# Patient Record
Sex: FEMALE | Race: BLACK OR AFRICAN AMERICAN | NOT HISPANIC OR LATINO
[De-identification: names, ages, dates, MRNs, and addresses within clinical notes are randomized per-mention and may not be internally consistent; named-entity substitution may affect disease eponyms.]

---

## 2017-01-31 ENCOUNTER — RESULT REVIEW (OUTPATIENT)
Age: 47
End: 2017-01-31

## 2018-05-16 ENCOUNTER — RESULT REVIEW (OUTPATIENT)
Age: 48
End: 2018-05-16

## 2018-08-22 PROBLEM — Z00.00 ENCOUNTER FOR PREVENTIVE HEALTH EXAMINATION: Status: ACTIVE | Noted: 2018-08-22

## 2018-08-29 ENCOUNTER — APPOINTMENT (OUTPATIENT)
Dept: SURGERY | Facility: CLINIC | Age: 48
End: 2018-08-29
Payer: COMMERCIAL

## 2018-08-29 PROCEDURE — 99205K: CUSTOM

## 2018-08-30 ENCOUNTER — OUTPATIENT (OUTPATIENT)
Dept: OUTPATIENT SERVICES | Facility: HOSPITAL | Age: 48
LOS: 1 days | End: 2018-08-30
Payer: COMMERCIAL

## 2018-08-30 ENCOUNTER — APPOINTMENT (OUTPATIENT)
Dept: MRI IMAGING | Facility: CLINIC | Age: 48
End: 2018-08-30
Payer: COMMERCIAL

## 2018-08-30 DIAGNOSIS — Z00.8 ENCOUNTER FOR OTHER GENERAL EXAMINATION: ICD-10-CM

## 2018-08-30 PROCEDURE — C8937: CPT

## 2018-08-30 PROCEDURE — 0159T: CPT | Mod: 26

## 2018-08-30 PROCEDURE — A9585: CPT

## 2018-08-30 PROCEDURE — C8908: CPT

## 2018-08-30 PROCEDURE — 82565 ASSAY OF CREATININE: CPT

## 2018-08-30 PROCEDURE — 77059 MRI BREAST BILATERAL: CPT | Mod: 26

## 2018-08-31 ENCOUNTER — RESULT REVIEW (OUTPATIENT)
Age: 48
End: 2018-08-31

## 2018-08-31 ENCOUNTER — OUTPATIENT (OUTPATIENT)
Dept: OUTPATIENT SERVICES | Facility: HOSPITAL | Age: 48
LOS: 1 days | End: 2018-08-31
Payer: COMMERCIAL

## 2018-08-31 PROCEDURE — 88321 CONSLTJ&REPRT SLD PREP ELSWR: CPT

## 2018-09-04 ENCOUNTER — RESULT REVIEW (OUTPATIENT)
Age: 48
End: 2018-09-04

## 2018-09-04 ENCOUNTER — OUTPATIENT (OUTPATIENT)
Dept: OUTPATIENT SERVICES | Facility: HOSPITAL | Age: 48
LOS: 1 days | End: 2018-09-04
Payer: COMMERCIAL

## 2018-09-04 ENCOUNTER — APPOINTMENT (OUTPATIENT)
Dept: ULTRASOUND IMAGING | Facility: IMAGING CENTER | Age: 48
End: 2018-09-04
Payer: COMMERCIAL

## 2018-09-04 ENCOUNTER — APPOINTMENT (OUTPATIENT)
Dept: MRI IMAGING | Facility: IMAGING CENTER | Age: 48
End: 2018-09-04
Payer: COMMERCIAL

## 2018-09-04 DIAGNOSIS — R92.8 OTHER ABNORMAL AND INCONCLUSIVE FINDINGS ON DIAGNOSTIC IMAGING OF BREAST: ICD-10-CM

## 2018-09-04 LAB — SURGICAL PATHOLOGY STUDY: SIGNIFICANT CHANGE UP

## 2018-09-04 PROCEDURE — 88360 TUMOR IMMUNOHISTOCHEM/MANUAL: CPT | Mod: 26

## 2018-09-04 PROCEDURE — 77066 DX MAMMO INCL CAD BI: CPT | Mod: 26

## 2018-09-04 PROCEDURE — 19085 BX BREAST 1ST LESION MR IMAG: CPT | Mod: RT

## 2018-09-04 PROCEDURE — A9585: CPT

## 2018-09-04 PROCEDURE — 76642 ULTRASOUND BREAST LIMITED: CPT | Mod: 26,LT

## 2018-09-04 PROCEDURE — 77066 DX MAMMO INCL CAD BI: CPT

## 2018-09-04 PROCEDURE — 88305 TISSUE EXAM BY PATHOLOGIST: CPT | Mod: 26

## 2018-09-04 PROCEDURE — 88360 TUMOR IMMUNOHISTOCHEM/MANUAL: CPT

## 2018-09-04 PROCEDURE — 88305 TISSUE EXAM BY PATHOLOGIST: CPT

## 2018-09-04 PROCEDURE — 19085 BX BREAST 1ST LESION MR IMAG: CPT

## 2018-09-04 PROCEDURE — 76642 ULTRASOUND BREAST LIMITED: CPT

## 2018-09-06 ENCOUNTER — OUTPATIENT (OUTPATIENT)
Dept: OUTPATIENT SERVICES | Facility: HOSPITAL | Age: 48
LOS: 1 days | End: 2018-09-06
Payer: COMMERCIAL

## 2018-09-06 VITALS
RESPIRATION RATE: 16 BRPM | WEIGHT: 151.9 LBS | TEMPERATURE: 98 F | HEART RATE: 75 BPM | HEIGHT: 62 IN | DIASTOLIC BLOOD PRESSURE: 78 MMHG | SYSTOLIC BLOOD PRESSURE: 120 MMHG

## 2018-09-06 DIAGNOSIS — Z90.711 ACQUIRED ABSENCE OF UTERUS WITH REMAINING CERVICAL STUMP: Chronic | ICD-10-CM

## 2018-09-06 DIAGNOSIS — D05.12 INTRADUCTAL CARCINOMA IN SITU OF LEFT BREAST: ICD-10-CM

## 2018-09-06 DIAGNOSIS — D05.10 INTRADUCTAL CARCINOMA IN SITU OF UNSPECIFIED BREAST: ICD-10-CM

## 2018-09-06 DIAGNOSIS — I10 ESSENTIAL (PRIMARY) HYPERTENSION: ICD-10-CM

## 2018-09-06 DIAGNOSIS — R06.83 SNORING: ICD-10-CM

## 2018-09-06 DIAGNOSIS — Z01.818 ENCOUNTER FOR OTHER PREPROCEDURAL EXAMINATION: ICD-10-CM

## 2018-09-06 LAB
ALBUMIN SERPL ELPH-MCNC: 4.6 G/DL — SIGNIFICANT CHANGE UP (ref 3.3–5)
ALP SERPL-CCNC: 46 U/L — SIGNIFICANT CHANGE UP (ref 40–120)
ALT FLD-CCNC: 28 U/L — SIGNIFICANT CHANGE UP (ref 4–33)
AST SERPL-CCNC: 21 U/L — SIGNIFICANT CHANGE UP (ref 4–32)
BILIRUB SERPL-MCNC: 3.6 MG/DL — HIGH (ref 0.2–1.2)
BLD GP AB SCN SERPL QL: NEGATIVE — SIGNIFICANT CHANGE UP
BUN SERPL-MCNC: 8 MG/DL — SIGNIFICANT CHANGE UP (ref 7–23)
CALCIUM SERPL-MCNC: 9.6 MG/DL — SIGNIFICANT CHANGE UP (ref 8.4–10.5)
CHLORIDE SERPL-SCNC: 103 MMOL/L — SIGNIFICANT CHANGE UP (ref 98–107)
CO2 SERPL-SCNC: 23 MMOL/L — SIGNIFICANT CHANGE UP (ref 22–31)
CREAT SERPL-MCNC: 0.67 MG/DL — SIGNIFICANT CHANGE UP (ref 0.5–1.3)
GLUCOSE SERPL-MCNC: 94 MG/DL — SIGNIFICANT CHANGE UP (ref 70–99)
HCT VFR BLD CALC: 32.3 % — LOW (ref 34.5–45)
HGB BLD-MCNC: 10.9 G/DL — LOW (ref 11.5–15.5)
MCHC RBC-ENTMCNC: 29 PG — SIGNIFICANT CHANGE UP (ref 27–34)
MCHC RBC-ENTMCNC: 33.7 % — SIGNIFICANT CHANGE UP (ref 32–36)
MCV RBC AUTO: 85.9 FL — SIGNIFICANT CHANGE UP (ref 80–100)
NRBC # FLD: 0 — SIGNIFICANT CHANGE UP
PLATELET # BLD AUTO: 234 K/UL — SIGNIFICANT CHANGE UP (ref 150–400)
PMV BLD: 11.4 FL — SIGNIFICANT CHANGE UP (ref 7–13)
POTASSIUM SERPL-MCNC: 3.4 MMOL/L — LOW (ref 3.5–5.3)
POTASSIUM SERPL-SCNC: 3.4 MMOL/L — LOW (ref 3.5–5.3)
PROT SERPL-MCNC: 7.5 G/DL — SIGNIFICANT CHANGE UP (ref 6–8.3)
RBC # BLD: 3.76 M/UL — LOW (ref 3.8–5.2)
RBC # FLD: 16.9 % — HIGH (ref 10.3–14.5)
RH IG SCN BLD-IMP: POSITIVE — SIGNIFICANT CHANGE UP
SODIUM SERPL-SCNC: 140 MMOL/L — SIGNIFICANT CHANGE UP (ref 135–145)
WBC # BLD: 7.29 K/UL — SIGNIFICANT CHANGE UP (ref 3.8–10.5)
WBC # FLD AUTO: 7.29 K/UL — SIGNIFICANT CHANGE UP (ref 3.8–10.5)

## 2018-09-06 PROCEDURE — 71046 X-RAY EXAM CHEST 2 VIEWS: CPT | Mod: 26

## 2018-09-06 PROCEDURE — 93010 ELECTROCARDIOGRAM REPORT: CPT

## 2018-09-06 RX ORDER — SODIUM CHLORIDE 9 MG/ML
3 INJECTION INTRAMUSCULAR; INTRAVENOUS; SUBCUTANEOUS EVERY 8 HOURS
Qty: 0 | Refills: 0 | Status: DISCONTINUED | OUTPATIENT
Start: 2018-10-09 | End: 2018-10-09

## 2018-09-06 RX ORDER — SODIUM CHLORIDE 9 MG/ML
1000 INJECTION, SOLUTION INTRAVENOUS
Qty: 0 | Refills: 0 | Status: DISCONTINUED | OUTPATIENT
Start: 2018-10-09 | End: 2018-10-09

## 2018-09-06 NOTE — H&P PST ADULT - NEGATIVE OPHTHALMOLOGIC SYMPTOMS
no loss of vision R/no lacrimation L/no lacrimation R/no blurred vision L/no blurred vision R/no loss of vision L/no discharge R/no discharge L/no diplopia

## 2018-09-06 NOTE — H&P PST ADULT - PSH
delivery in  for one of the twin and one twin  vaginal delivery -     h/o   x 2 - 20 yrs ago    H/O tubal ligation    History of cholecystectomy  delivery in  for one of the twin and one twin  vaginal delivery -     h/o   x 2 - 20 yrs ago    H/O tubal ligation    History of cholecystectomy    Status post partial hysterectomy  2012

## 2018-09-06 NOTE — H&P PST ADULT - PROBLEM SELECTOR PLAN 1
Pt initial scheduled for left breast mastectomy, procedure chanaged as per Dr. Cruz.  Pt scheduled for bilateral simple mastectomy confirmed by surgical coordinator (Starla) of Dr. Cruz with reconstruction on 9/11/18 (tentative).     Pre-op instructions given, patient verbalized understanding.   Pepcid given to patient for GI prophylaxis.   Chlorhexidine wash provided, instructions given.  Pt instructed to bring urine sample the day of the surgery.     Medical evaluation requested due to current cold, pending medical clearance.

## 2018-09-06 NOTE — H&P PST ADULT - NEGATIVE NEUROLOGICAL SYMPTOMS
no transient paralysis/no headache/no tremors/no vertigo/no loss of sensation/no difficulty walking/no weakness/no paresthesias

## 2018-09-06 NOTE — H&P PST ADULT - NEGATIVE GENERAL GENITOURINARY SYMPTOMS
no flank pain L/no dysuria/no nocturia/no incontinence/no bladder infections/normal urinary frequency/no urinary hesitancy/no hematuria/no renal colic/no flank pain R

## 2018-09-06 NOTE — H&P PST ADULT - HISTORY OF PRESENT ILLNESS
47 year old female presents to PST for intraductal carcinoma in situ of left breast.  Routine screening for mammogram 8/1 was abnormal. Pt was called o do biopsy.  1st biopsy 8/17, 2nd biopsy 9/4.  Pt scheduled for b/l mastectomy with reconstruction on 9/11/18. 47 year old female presents to PST for intraductal carcinoma in situ of left breast.  Routine screening for mammogram 8/1 was abnormal. Pt was called o do biopsy.  1st biopsy 8/17, 2nd biopsy 9/4.  Pt scheduled for b/l mastectomy with reconstruction on 9/11/18 (tentative).  Pt was initial scheduled for left breast mastectomy. Procedure changed to b/l mastectomy 9/6.  Confirmed with surgical coordinator, Starla.

## 2018-09-06 NOTE — H&P PST ADULT - PMH
Anemia    Endometriosis    Gall stones    H/O uterine leiomyoma    Hypertension  Feb 2018  Intraductal carcinoma in situ of breast

## 2018-09-06 NOTE — H&P PST ADULT - NSANTHOSAYNRD_GEN_A_CORE
No. MACHO screening performed.  STOP BANG Legend: 0-2 = LOW Risk; 3-4 = INTERMEDIATE Risk; 5-8 = HIGH Risk/never been tested

## 2018-09-06 NOTE — H&P PST ADULT - NEGATIVE ENMT SYMPTOMS
no throat pain/no post-nasal discharge/no nose bleeds/no nasal obstruction/no tinnitus/no vertigo/no sinus symptoms/no hearing difficulty/no dysphagia/no ear pain

## 2018-09-06 NOTE — H&P PST ADULT - PROBLEM SELECTOR PLAN 2
Pt instructed to take Bystolic the morning of the surgery.    Pt instructed to hold aspirin 1 week prior to surgery.

## 2018-09-06 NOTE — H&P PST ADULT - LYMPHATIC
posterior cervical L/anterior cervical R/anterior cervical L/supraclavicular R/posterior cervical R/supraclavicular L

## 2018-09-07 PROBLEM — I10 ESSENTIAL (PRIMARY) HYPERTENSION: Chronic | Status: ACTIVE | Noted: 2018-09-06

## 2018-09-11 ENCOUNTER — APPOINTMENT (OUTPATIENT)
Dept: MAMMOGRAPHY | Facility: HOSPITAL | Age: 48
End: 2018-09-11

## 2018-09-25 ENCOUNTER — TRANSCRIPTION ENCOUNTER (OUTPATIENT)
Age: 48
End: 2018-09-25

## 2018-10-09 ENCOUNTER — RESULT REVIEW (OUTPATIENT)
Age: 48
End: 2018-10-09

## 2018-10-09 ENCOUNTER — APPOINTMENT (OUTPATIENT)
Dept: NUCLEAR MEDICINE | Facility: HOSPITAL | Age: 48
End: 2018-10-09

## 2018-10-09 ENCOUNTER — INPATIENT (INPATIENT)
Facility: HOSPITAL | Age: 48
LOS: 0 days | Discharge: HOME | End: 2018-10-10
Attending: SURGERY | Admitting: SURGERY
Payer: COMMERCIAL

## 2018-10-09 ENCOUNTER — APPOINTMENT (OUTPATIENT)
Dept: SURGERY | Facility: HOSPITAL | Age: 48
End: 2018-10-09

## 2018-10-09 VITALS
OXYGEN SATURATION: 100 % | HEIGHT: 62 IN | TEMPERATURE: 98 F | WEIGHT: 151.9 LBS | DIASTOLIC BLOOD PRESSURE: 80 MMHG | RESPIRATION RATE: 16 BRPM | HEART RATE: 68 BPM | SYSTOLIC BLOOD PRESSURE: 142 MMHG

## 2018-10-09 DIAGNOSIS — Z90.711 ACQUIRED ABSENCE OF UTERUS WITH REMAINING CERVICAL STUMP: Chronic | ICD-10-CM

## 2018-10-09 DIAGNOSIS — D05.12 INTRADUCTAL CARCINOMA IN SITU OF LEFT BREAST: ICD-10-CM

## 2018-10-09 LAB
BLD GP AB SCN SERPL QL: NEGATIVE — SIGNIFICANT CHANGE UP
HCG UR QL: NEGATIVE — SIGNIFICANT CHANGE UP
RH IG SCN BLD-IMP: POSITIVE — SIGNIFICANT CHANGE UP

## 2018-10-09 PROCEDURE — 88331 PATH CONSLTJ SURG 1 BLK 1SPC: CPT | Mod: 26

## 2018-10-09 PROCEDURE — 15734 MUSCLE-SKIN GRAFT TRUNK: CPT | Mod: 59,RT

## 2018-10-09 PROCEDURE — 88332 PATH CONSLTJ SURG EA ADD BLK: CPT | Mod: 26

## 2018-10-09 PROCEDURE — 15777 ACELLULAR DERM MATRIX IMPLT: CPT | Mod: 50

## 2018-10-09 PROCEDURE — 88307 TISSUE EXAM BY PATHOLOGIST: CPT | Mod: 26

## 2018-10-09 PROCEDURE — 19357 TISS XPNDR PLMT BRST RCNSTJ: CPT | Mod: 50

## 2018-10-09 PROCEDURE — 12034 INTMD RPR S/TR/EXT 7.6-12.5: CPT | Mod: 59

## 2018-10-09 RX ORDER — LOSARTAN POTASSIUM 100 MG/1
100 TABLET, FILM COATED ORAL DAILY
Qty: 0 | Refills: 0 | Status: DISCONTINUED | OUTPATIENT
Start: 2018-10-10 | End: 2018-10-10

## 2018-10-09 RX ORDER — METOPROLOL TARTRATE 50 MG
5 TABLET ORAL EVERY 6 HOURS
Qty: 0 | Refills: 0 | Status: DISCONTINUED | OUTPATIENT
Start: 2018-10-09 | End: 2018-10-10

## 2018-10-09 RX ORDER — DEXTROSE MONOHYDRATE, SODIUM CHLORIDE, AND POTASSIUM CHLORIDE 50; .745; 4.5 G/1000ML; G/1000ML; G/1000ML
1000 INJECTION, SOLUTION INTRAVENOUS
Qty: 0 | Refills: 0 | Status: DISCONTINUED | OUTPATIENT
Start: 2018-10-09 | End: 2018-10-09

## 2018-10-09 RX ORDER — CEFAZOLIN SODIUM 1 G
1000 VIAL (EA) INJECTION EVERY 8 HOURS
Qty: 0 | Refills: 0 | Status: DISCONTINUED | OUTPATIENT
Start: 2018-10-09 | End: 2018-10-10

## 2018-10-09 RX ORDER — ONDANSETRON 8 MG/1
4 TABLET, FILM COATED ORAL EVERY 8 HOURS
Qty: 0 | Refills: 0 | Status: DISCONTINUED | OUTPATIENT
Start: 2018-10-09 | End: 2018-10-10

## 2018-10-09 RX ORDER — NEBIVOLOL HYDROCHLORIDE 5 MG/1
1 TABLET ORAL
Qty: 0 | Refills: 0 | COMMUNITY

## 2018-10-09 RX ORDER — FOLIC ACID 0.8 MG
1 TABLET ORAL DAILY
Qty: 0 | Refills: 0 | Status: DISCONTINUED | OUTPATIENT
Start: 2018-10-10 | End: 2018-10-10

## 2018-10-09 RX ORDER — METOCLOPRAMIDE HCL 10 MG
10 TABLET ORAL ONCE
Qty: 0 | Refills: 0 | Status: DISCONTINUED | OUTPATIENT
Start: 2018-10-09 | End: 2018-10-09

## 2018-10-09 RX ORDER — ACETAMINOPHEN 500 MG
1000 TABLET ORAL ONCE
Qty: 0 | Refills: 0 | Status: COMPLETED | OUTPATIENT
Start: 2018-10-09 | End: 2018-10-09

## 2018-10-09 RX ORDER — DIAZEPAM 5 MG
5 TABLET ORAL EVERY 6 HOURS
Qty: 0 | Refills: 0 | Status: DISCONTINUED | OUTPATIENT
Start: 2018-10-09 | End: 2018-10-10

## 2018-10-09 RX ORDER — ACETAMINOPHEN 500 MG
650 TABLET ORAL EVERY 6 HOURS
Qty: 0 | Refills: 0 | Status: DISCONTINUED | OUTPATIENT
Start: 2018-10-09 | End: 2018-10-10

## 2018-10-09 RX ORDER — HYDROMORPHONE HYDROCHLORIDE 2 MG/ML
0.5 INJECTION INTRAMUSCULAR; INTRAVENOUS; SUBCUTANEOUS
Qty: 0 | Refills: 0 | Status: DISCONTINUED | OUTPATIENT
Start: 2018-10-09 | End: 2018-10-09

## 2018-10-09 RX ORDER — OXYCODONE HYDROCHLORIDE 5 MG/1
5 TABLET ORAL EVERY 4 HOURS
Qty: 0 | Refills: 0 | Status: DISCONTINUED | OUTPATIENT
Start: 2018-10-09 | End: 2018-10-10

## 2018-10-09 RX ORDER — OXYCODONE HYDROCHLORIDE 5 MG/1
10 TABLET ORAL EVERY 4 HOURS
Qty: 0 | Refills: 0 | Status: DISCONTINUED | OUTPATIENT
Start: 2018-10-09 | End: 2018-10-10

## 2018-10-09 RX ORDER — ENOXAPARIN SODIUM 100 MG/ML
40 INJECTION SUBCUTANEOUS ONCE
Qty: 0 | Refills: 0 | Status: COMPLETED | OUTPATIENT
Start: 2018-10-09 | End: 2018-10-09

## 2018-10-09 RX ORDER — INFLUENZA VIRUS VACCINE 15; 15; 15; 15 UG/.5ML; UG/.5ML; UG/.5ML; UG/.5ML
0.5 SUSPENSION INTRAMUSCULAR ONCE
Qty: 0 | Refills: 0 | Status: COMPLETED | OUTPATIENT
Start: 2018-10-09 | End: 2018-10-10

## 2018-10-09 RX ORDER — ONDANSETRON 8 MG/1
4 TABLET, FILM COATED ORAL ONCE
Qty: 0 | Refills: 0 | Status: DISCONTINUED | OUTPATIENT
Start: 2018-10-09 | End: 2018-10-09

## 2018-10-09 RX ADMIN — Medication 100 MILLIGRAM(S): at 18:29

## 2018-10-09 RX ADMIN — DEXTROSE MONOHYDRATE, SODIUM CHLORIDE, AND POTASSIUM CHLORIDE 100 MILLILITER(S): 50; .745; 4.5 INJECTION, SOLUTION INTRAVENOUS at 14:49

## 2018-10-09 RX ADMIN — Medication 1000 MILLIGRAM(S): at 18:00

## 2018-10-09 RX ADMIN — Medication 400 MILLIGRAM(S): at 17:37

## 2018-10-09 RX ADMIN — Medication 5 MILLIGRAM(S): at 15:28

## 2018-10-09 RX ADMIN — ENOXAPARIN SODIUM 40 MILLIGRAM(S): 100 INJECTION SUBCUTANEOUS at 18:29

## 2018-10-09 RX ADMIN — DEXTROSE MONOHYDRATE, SODIUM CHLORIDE, AND POTASSIUM CHLORIDE 100 MILLILITER(S): 50; .745; 4.5 INJECTION, SOLUTION INTRAVENOUS at 17:37

## 2018-10-09 NOTE — ASU PATIENT PROFILE, ADULT - VISION (WITH CORRECTIVE LENSES IF THE PATIENT USUALLY WEARS THEM):
Normal vision: sees adequately in most situations; can see medication labels, newsprint/contact use Partially impaired: cannot see medication labels or newsprint, but can see obstacles in path, and the surrounding layout; can count fingers at arm's length/wears glasses

## 2018-10-09 NOTE — BRIEF OPERATIVE NOTE - PROCEDURE
<<-----Click on this checkbox to enter Procedure Breast reconstruction with tissue expander  10/09/2018  Placement of Bilateral Tissue Expanders  Active  JUSS

## 2018-10-09 NOTE — ASU PREOP CHECKLIST - SELECT TESTS ORDERED
PT/PTT/CMP/CBC/INR CBC/CMP/Type and Cross/PT/PTT/INR/Type and Screen CMP/Type and Cross/INR/UCG/PT/PTT/Type and Screen/UCG - NEGATIVE/CBC

## 2018-10-10 ENCOUNTER — TRANSCRIPTION ENCOUNTER (OUTPATIENT)
Age: 48
End: 2018-10-10

## 2018-10-10 VITALS
OXYGEN SATURATION: 100 % | HEART RATE: 79 BPM | DIASTOLIC BLOOD PRESSURE: 60 MMHG | RESPIRATION RATE: 18 BRPM | SYSTOLIC BLOOD PRESSURE: 106 MMHG | TEMPERATURE: 99 F

## 2018-10-10 RX ORDER — CEPHALEXIN 500 MG
1 CAPSULE ORAL
Qty: 0 | Refills: 0 | COMMUNITY

## 2018-10-10 RX ORDER — ACETAMINOPHEN 500 MG
2 TABLET ORAL
Qty: 0 | Refills: 0 | COMMUNITY
Start: 2018-10-10

## 2018-10-10 RX ORDER — OXYCODONE HYDROCHLORIDE 5 MG/1
1 TABLET ORAL
Qty: 0 | Refills: 0 | COMMUNITY
Start: 2018-10-10

## 2018-10-10 RX ORDER — DIAZEPAM 5 MG
1 TABLET ORAL
Qty: 0 | Refills: 0 | COMMUNITY
Start: 2018-10-10

## 2018-10-10 RX ORDER — LOSARTAN POTASSIUM 100 MG/1
100 TABLET, FILM COATED ORAL DAILY
Qty: 0 | Refills: 0 | Status: DISCONTINUED | OUTPATIENT
Start: 2018-10-10 | End: 2018-10-10

## 2018-10-10 RX ADMIN — Medication 1 MILLIGRAM(S): at 10:13

## 2018-10-10 RX ADMIN — Medication 5 MILLIGRAM(S): at 10:35

## 2018-10-10 RX ADMIN — Medication 5 MILLIGRAM(S): at 01:37

## 2018-10-10 RX ADMIN — INFLUENZA VIRUS VACCINE 0.5 MILLILITER(S): 15; 15; 15; 15 SUSPENSION INTRAMUSCULAR at 10:17

## 2018-10-10 RX ADMIN — Medication 100 MILLIGRAM(S): at 03:17

## 2018-10-10 RX ADMIN — Medication 100 MILLIGRAM(S): at 10:13

## 2018-10-10 NOTE — DISCHARGE NOTE ADULT - CARE PROVIDERS DIRECT ADDRESSES
[email or web address]

## 2018-10-10 NOTE — DISCHARGE NOTE ADULT - INSTRUCTIONS
Regular diet drink 9-13 eight oz. glasses of fluid daily. call md for follow up appt. call md for sign of infection (temp greater than 101f, redness at incision, pain not relieved by meds). Regular diet    - Please keep drain sites and incisions clean and dry.  - Do not lift your operated arm(s) above shoulder height.   - Do not push or pull yourself onto or off the bed with your operated arm(s). Instead use the roll technique to get in or out of bed.   - Avoid heavy activities and (sudden) lifting.  - Avoid lifting any objects that weigh more than 10 pounds or a gallon of milk.   - You may begin a walking program, but do not “break a sweat”.  - Be mindful of your posture while you are healing.  - Try not to be round shouldered or in a slouched posture.  - Avoid tub bathing until the sutures are well healed, usually three weeks.    - For the next 2 weeks, you should examine your breasts at least three times daily. This is most easily done when you use the bathroom/restroom.   - If you notice new bruising or breast fullness that wasn't there previously, CALL the office IMMEDIATELY.     - You will be discharged with ANIBAL drains. You will need to empty them and record outputs accurately. This will be taught to you by the nursing staff. Please do not remove the ANIBAL drains. They will be removed in the office. Please bring to the office accurate records of output.

## 2018-10-10 NOTE — DISCHARGE NOTE ADULT - HOSPITAL COURSE
47 year old female presents to PST for intraductal carcinoma in situ of left breast.  Routine screening for mammogram 8/1 was abnormal. Pt was called o do biopsy.  1st biopsy 8/17, 2nd biopsy 9/4.  Pt scheduled for b/l mastectomy with reconstruction on 9/11/18 (tentative).  Pt was initial scheduled for left breast mastectomy. Procedure changed to b/l mastectomy 9/6.      Patient is status post bilateral mastectomy with tissue expanders.  Patient has 2 JPs.    Patient did well post operatively.  Pain is well controlled.  Patient is ambulating and voiding without difficulty.     Patient is stable for discharge home with ANIBAL drains and will follow up with Dr Cruz and Dr Huggins. 47 year old female presents to PST for intraductal carcinoma in situ of left breast.  Routine screening for mammogram 8/1 was abnormal. Pt was called o do biopsy.  1st biopsy 8/17, 2nd biopsy 9/4.  Pt scheduled for b/l mastectomy with reconstruction on 9/11/18 (tentative).  Pt was initial scheduled for left breast mastectomy. Procedure changed to b/l mastectomy 9/6.      Patient is status post bilateral mastectomy with tissue expanders.  Patient has 2 JPs.    Patient did well post operatively.  Pain is well controlled.  Patient is ambulating and voiding without difficulty.     Patient has been taught ANIBAL drain care/emptying and feels comfortable performing this task and documenting it.    Patient is stable for discharge home with ANIBAL drains and will follow up with Dr Cruz and Dr Huggins.

## 2018-10-10 NOTE — DISCHARGE NOTE ADULT - CARE PROVIDER_API CALL
Michelle Cruz (MD), FPPLJ Breast Surgery  [address]  [address]  [place], [place] [id number]  Phone: [phone]  Fax: [phone]    Jagdish Huggins), Plastic Surgery  [address] [place] [address]  [address]  [place] [address]  Phone: [phone]  Fax: [phone]

## 2018-10-10 NOTE — DISCHARGE NOTE ADULT - ADDITIONAL INSTRUCTIONS
Please call [phone] for follow-up appointment in 1-2 weeks.   Please follow up with Dr Huggins in one week.  Call to schedule an appointment.  Please follow up with Dr. Cruz.  Call today for appointment in 1 week.  Please call to make an appointment to follow up with your primary care physician regarding your recent hospitalization.

## 2018-10-10 NOTE — PROGRESS NOTE ADULT - ASSESSMENT
48 y/o female POD1 s/p Bilateral mastectomy, breast recon with tissue expander.    -Pain control  -Reg diet  -Abx  -DVT ppx  -ANIBAL drain instruction  -dispo planning

## 2018-10-10 NOTE — PROGRESS NOTE ADULT - ASSESSMENT
ASSESSMENT: 47F POD1 s/p b/l mastectomies and tissue expander placement    PLAN:  --drain care  --surgical bra  --care per primary team        Plastic Surgery  [id number]

## 2018-10-10 NOTE — DISCHARGE NOTE ADULT - CARE PLAN
Principal Discharge DX:	Intraductal carcinoma in situ of breast  Goal:	status post bilateral matectomy, tissue expanders  Assessment and plan of treatment:	Please call [phone] for follow-up appointment in 1-2 weeks.   Please follow up with Dr Huggins in one week.  Call to schedule an appointment.  You will be discharged with ANIBAL drains. You will need to empty them and record outputs accurately. This will be taught to you by the nursing staff. Please do not remove the ANIBAL drains. They will be removed in the office. Please bring to the office accurate records of output.   -Please allow steri-strips to fall off on their own.  Ok to shower and rinse wound with warm soapy water.  Do not scrub wound, pat dry. Please call the doctor immediately if you develop fever, chills, drainage from wound.  Follow a regular diet. Do not drive or operate machinery while taking narcotic pain medication.   Please call to make an appointment to follow up with your primary care physician regarding your recent hospitalization.  Secondary Diagnosis:	Hypertension  Goal:	Continue current medication Principal Discharge DX:	Intraductal carcinoma in situ of breast  Goal:	status post bilateral matectomy, tissue expanders  Assessment and plan of treatment:	Please call [phone] for follow-up appointment in 1-2 weeks.   Please follow up with Dr Huggins in one week.  Call to schedule an appointment.  Please follow up with Dr. Cruz.  Call today for appointment in 1 week.  You will be discharged with ANIBAL drains. You will need to empty them and record outputs accurately. This will be taught to you by the nursing staff. Please do not remove the ANIBAL drains. They will be removed in the office. Please bring to the office accurate records of output.   -Please allow steri-strips to fall off on their own.  Ok to shower and rinse wound with warm soapy water.  Do not scrub wound, pat dry. Please call the doctor immediately if you develop fever, chills, drainage from wound.  Follow a regular diet. Do not drive or operate machinery while taking narcotic pain medication.   Please call to make an appointment to follow up with your primary care physician regarding your recent hospitalization.  Secondary Diagnosis:	Hypertension  Goal:	Continue current medication

## 2018-10-10 NOTE — DISCHARGE NOTE ADULT - PLAN OF CARE
status post bilateral matectomy, tissue expanders Please call [phone] for follow-up appointment in 1-2 weeks.   Please follow up with Dr Huggins in one week.  Call to schedule an appointment.  You will be discharged with ANIBAL drains. You will need to empty them and record outputs accurately. This will be taught to you by the nursing staff. Please do not remove the ANIBAL drains. They will be removed in the office. Please bring to the office accurate records of output.   -Please allow steri-strips to fall off on their own.  Ok to shower and rinse wound with warm soapy water.  Do not scrub wound, pat dry. Please call the doctor immediately if you develop fever, chills, drainage from wound.  Follow a regular diet. Do not drive or operate machinery while taking narcotic pain medication.   Please call to make an appointment to follow up with your primary care physician regarding your recent hospitalization. Continue current medication Please call [phone] for follow-up appointment in 1-2 weeks.   Please follow up with Dr Huggins in one week.  Call to schedule an appointment.  Please follow up with Dr. Cruz.  Call today for appointment in 1 week.  You will be discharged with ANIBAL drains. You will need to empty them and record outputs accurately. This will be taught to you by the nursing staff. Please do not remove the ANIBAL drains. They will be removed in the office. Please bring to the office accurate records of output.   -Please allow steri-strips to fall off on their own.  Ok to shower and rinse wound with warm soapy water.  Do not scrub wound, pat dry. Please call the doctor immediately if you develop fever, chills, drainage from wound.  Follow a regular diet. Do not drive or operate machinery while taking narcotic pain medication.   Please call to make an appointment to follow up with your primary care physician regarding your recent hospitalization.

## 2018-10-10 NOTE — DISCHARGE NOTE ADULT - CONDITIONS AT DISCHARGE
patient has bilateral axillary drains, intact an without sign of infection at entry site, bilateral breast steris intact and dry. pt ambulating, eating, voiding without difficulty. iv discontinued. no distress noted.

## 2018-10-10 NOTE — PROGRESS NOTE ADULT - SUBJECTIVE AND OBJECTIVE BOX
S: Patient doing well, denies fevers, chills, nausea, emesis, SOB.  No acute events overnight. Pain controlled.      O: Vital Signs  T(C): 36.8 (10-10 @ 05:54), Max: 36.9 (10-09 @ 12:55)  HR: 80 (10-10 @ 05:54) (80 - 107)  BP: 92/75 (10-10 @ 05:54) (92/75 - 151/73)  RR: 20 (10-10 @ 05:54) (14 - 20)  SpO2: 98% (10-10 @ 05:54) (97% - 99%)  10-09-18 @ 07:01  -  10-10-18 @ 07:00  --------------------------------------------------------  IN: 200 mL / OUT: 1855 mL / NET: -1655 mL      General: alert and oriented, NAD  Surgical bra intact  Dressings c/d/i   wounds hemostatic  Breast soft, no hematoma  drain intact, ss output

## 2018-10-10 NOTE — CHART NOTE - NSCHARTNOTEFT_GEN_A_CORE
Post Operative Note      Procedure: Bilateral mastectomy, breast recon with tissue expander (plastic surgery)    Subjective: Patient reports an episode of emesis after the OR, but reports feeling much better afterwards.  Pain controlled.  Denies current nausea.    Objective:    T(C): 36.7 (10-09-18 @ 22:27), Max: 36.9 (10-09-18 @ 12:55)  HR: 87 (10-09-18 @ 22:27) (68 - 107)  BP: 108/51 (10-09-18 @ 22:27) (108/51 - 151/73)  RR: 18 (10-09-18 @ 22:27) (14 - 18)  SpO2: 99% (10-09-18 @ 22:27) (97% - 100%)      10-09-18 @ 07:01  -  10-10-18 @ 01:05  --------------------------------------------------------  IN: 200 mL / OUT: 1562.5 mL / NET: -1362.5 mL        Physical Exam:  Gen: NAD  Surgical bra intact  Dressings c/d/i   wounds hemostatic  Breast soft, no hematoma  drain intact, ss output      Assessment/Plan: 46 y/o female s/p Bilateral mastectomy, breast recon with tissue expander.    -Pain control  -Reg diet  -Abx  -DVT ppx

## 2018-10-10 NOTE — PROGRESS NOTE ADULT - SUBJECTIVE AND OBJECTIVE BOX
RAF MELGOZA  [id number]    Subjective:  Patient is a 47y old  Female who presents with a chief complaint of admitted for breast surgery (10 Oct 2018 09:52)   Patient was seen and examined at bedside. No acute events overnight. Pain controlled.    Objective:  T(C): 36.8 (10-10-18 @ 05:54), Max: 36.9 (10-09-18 @ 12:55)  HR: 80 (10-10-18 @ 05:54) (80 - 107)  BP: 92/75 (10-10-18 @ 05:54) (92/75 - 151/73)  RR: 20 (10-10-18 @ 05:54) (14 - 20)  SpO2: 98% (10-10-18 @ 05:54) (97% - 99%)  Wt(kg): --           10-09 @ 07:01  -  10-10 @ 07:00  --------------------------------------------------------  IN: 200 mL / OUT: 1855 mL / NET: -1655 mL      PHYSICAL EXAM:    General: NAD  Chest: incisions c/d/i bilaterally, no collections, JPx2 SS, surgical bra            MEDICATIONS  (STANDING):  ceFAZolin   IVPB 1000 milliGRAM(s) IV Intermittent every 8 hours  folic acid 1 milliGRAM(s) Oral daily  influenza   Vaccine 0.5 milliLiter(s) IntraMuscular once  losartan 100 milliGRAM(s) Oral daily    MEDICATIONS  (PRN):  acetaminophen   Tablet .. 650 milliGRAM(s) Oral every 6 hours PRN Mild Pain (1 - 3)  diazepam    Tablet 5 milliGRAM(s) Oral every 6 hours PRN Muscle Spasm  metoprolol tartrate Injectable 5 milliGRAM(s) IV Push every 6 hours PRN For Systolic Blood Pressure Greater Than 135 mmHg  ondansetron Injectable 4 milliGRAM(s) IV Push every 8 hours PRN Nausea and/or Vomiting  oxyCODONE    IR 5 milliGRAM(s) Oral every 4 hours PRN Moderate Pain (4 - 6)  oxyCODONE    IR 10 milliGRAM(s) Oral every 4 hours PRN Severe Pain (7 - 10)

## 2018-10-10 NOTE — DISCHARGE NOTE ADULT - PATIENT PORTAL LINK FT
You can access the [facility] Portal, offered by [facility], by registering with the following website: [email or web address]

## 2018-10-10 NOTE — DISCHARGE NOTE ADULT - MEDICATION SUMMARY - MEDICATIONS TO TAKE
I will START or STAY ON the medications listed below when I get home from the hospital:    iron  -- 1 tab(s) by mouth once a day  -- Indication: For Home Medication    vitamin D  -- 1 tab(s) by mouth once a day  -- Indication: For Home Medication    aspirin 81 mg oral tablet  -- 1 tab(s) by mouth once a day last 8/30/18   -- Indication: For Home Medication    oxyCODONE 5 mg oral tablet  -- 1 tab(s) by mouth every 4 hours, As needed, Moderate Pain (4 - 6)  -- Indication: For Post Operative Moderate Pain    acetaminophen 325 mg oral tablet  -- 2 tab(s) by mouth every 6 hours, As needed, Mild Pain (1 - 3)  -- Indication: For Post Operative Mild Pain    losartan 100 mg oral tablet  -- 1 tab(s) by mouth once a day  -- Indication: For Home Medication    diazePAM 5 mg oral tablet  -- 1 tab(s) by mouth every 6 hours, As needed, Muscle Spasm  -- Indication: For Post operative muscle spasm    Bystolic 10 mg oral tablet  -- 20 tab(s) by mouth once a day  -- Indication: For Home Medication    Keflex 500 mg oral capsule  -- 1 tab(s) by mouth every 6 hours (prescription given to patient prior to hospital admission)  -- Indication: For Antibiotics for Operative Drains per Plastic Surgery    folic acid  -- 1 tab(s) by mouth once a day  -- Indication: For Home Medication    Vitamin B-12  -- 1 tab(s) by mouth once a day   -- Indication: For Home Medication I will START or STAY ON the medications listed below when I get home from the hospital:    iron  -- 1 tab(s) by mouth once a day  -- Indication: For Home Medication    vitamin D  -- 1 tab(s) by mouth once a day  -- Indication: For Home Medication    Percocet 5/325 oral tablet  -- 1 tab(s) by mouth every 6 hours prn moderate pain MDD 6  -- Indication: For Post Operative Pain Medication    aspirin 81 mg oral tablet  -- 1 tab(s) by mouth once a day last 8/30/18   -- Indication: For Home Medication    losartan 100 mg oral tablet  -- 1 tab(s) by mouth once a day  -- Indication: For Home Medication    diazePAM 5 mg oral tablet  -- 1 tab(s) by mouth every 6 hours, As needed, Muscle Spasm  -- Indication: For Post operative muscle spasm    Bystolic 10 mg oral tablet  -- 20 tab(s) by mouth once a day  -- Indication: For Home Medication    cefadroxil 500 mg oral capsule  -- 1 cap(s) by mouth every 12 hours  -- Indication: For Antibiotic     Vitamin B-12  -- 1 tab(s) by mouth once a day   -- Indication: For Home Medication    folic acid  -- 1 tab(s) by mouth once a day  -- Indication: For Home Medication

## 2018-10-12 LAB — SURGICAL PATHOLOGY STUDY: SIGNIFICANT CHANGE UP

## 2018-10-15 ENCOUNTER — APPOINTMENT (OUTPATIENT)
Dept: SURGERY | Facility: CLINIC | Age: 48
End: 2018-10-15
Payer: COMMERCIAL

## 2018-10-15 PROCEDURE — 99024 POSTOP FOLLOW-UP VISIT: CPT

## 2019-01-31 ENCOUNTER — OUTPATIENT (OUTPATIENT)
Dept: OUTPATIENT SERVICES | Facility: HOSPITAL | Age: 49
LOS: 1 days | End: 2019-01-31
Payer: COMMERCIAL

## 2019-01-31 VITALS
SYSTOLIC BLOOD PRESSURE: 130 MMHG | WEIGHT: 153 LBS | HEART RATE: 70 BPM | RESPIRATION RATE: 14 BRPM | OXYGEN SATURATION: 98 % | HEIGHT: 62 IN | DIASTOLIC BLOOD PRESSURE: 80 MMHG | TEMPERATURE: 98 F

## 2019-01-31 DIAGNOSIS — R06.83 SNORING: ICD-10-CM

## 2019-01-31 DIAGNOSIS — Z90.711 ACQUIRED ABSENCE OF UTERUS WITH REMAINING CERVICAL STUMP: Chronic | ICD-10-CM

## 2019-01-31 DIAGNOSIS — C50.919 MALIGNANT NEOPLASM OF UNSPECIFIED SITE OF UNSPECIFIED FEMALE BREAST: ICD-10-CM

## 2019-01-31 DIAGNOSIS — Z98.890 OTHER SPECIFIED POSTPROCEDURAL STATES: Chronic | ICD-10-CM

## 2019-01-31 DIAGNOSIS — I10 ESSENTIAL (PRIMARY) HYPERTENSION: ICD-10-CM

## 2019-01-31 LAB
ALBUMIN SERPL ELPH-MCNC: 4.7 G/DL — SIGNIFICANT CHANGE UP (ref 3.3–5)
ALP SERPL-CCNC: 49 U/L — SIGNIFICANT CHANGE UP (ref 40–120)
ALT FLD-CCNC: 26 U/L — SIGNIFICANT CHANGE UP (ref 4–33)
ANION GAP SERPL CALC-SCNC: 14 MMO/L — SIGNIFICANT CHANGE UP (ref 7–14)
AST SERPL-CCNC: 19 U/L — SIGNIFICANT CHANGE UP (ref 4–32)
BASOPHILS # BLD AUTO: 0.03 K/UL — SIGNIFICANT CHANGE UP (ref 0–0.2)
BASOPHILS NFR BLD AUTO: 0.6 % — SIGNIFICANT CHANGE UP (ref 0–2)
BILIRUB DIRECT SERPL-MCNC: 0.3 MG/DL — HIGH (ref 0.1–0.2)
BILIRUB SERPL-MCNC: 2 MG/DL — HIGH (ref 0.2–1.2)
BLD GP AB SCN SERPL QL: NEGATIVE — SIGNIFICANT CHANGE UP
BUN SERPL-MCNC: 8 MG/DL — SIGNIFICANT CHANGE UP (ref 7–23)
CALCIUM SERPL-MCNC: 9.4 MG/DL — SIGNIFICANT CHANGE UP (ref 8.4–10.5)
CHLORIDE SERPL-SCNC: 104 MMOL/L — SIGNIFICANT CHANGE UP (ref 98–107)
CO2 SERPL-SCNC: 24 MMOL/L — SIGNIFICANT CHANGE UP (ref 22–31)
CREAT SERPL-MCNC: 0.62 MG/DL — SIGNIFICANT CHANGE UP (ref 0.5–1.3)
EOSINOPHIL # BLD AUTO: 0.15 K/UL — SIGNIFICANT CHANGE UP (ref 0–0.5)
EOSINOPHIL NFR BLD AUTO: 3.1 % — SIGNIFICANT CHANGE UP (ref 0–6)
GLUCOSE SERPL-MCNC: 98 MG/DL — SIGNIFICANT CHANGE UP (ref 70–99)
HCT VFR BLD CALC: 30.1 % — LOW (ref 34.5–45)
HGB BLD-MCNC: 9.7 G/DL — LOW (ref 11.5–15.5)
IMM GRANULOCYTES NFR BLD AUTO: 0.4 % — SIGNIFICANT CHANGE UP (ref 0–1.5)
LYMPHOCYTES # BLD AUTO: 1.23 K/UL — SIGNIFICANT CHANGE UP (ref 1–3.3)
LYMPHOCYTES # BLD AUTO: 25.4 % — SIGNIFICANT CHANGE UP (ref 13–44)
MCHC RBC-ENTMCNC: 29.3 PG — SIGNIFICANT CHANGE UP (ref 27–34)
MCHC RBC-ENTMCNC: 32.2 % — SIGNIFICANT CHANGE UP (ref 32–36)
MCV RBC AUTO: 90.9 FL — SIGNIFICANT CHANGE UP (ref 80–100)
MONOCYTES # BLD AUTO: 0.37 K/UL — SIGNIFICANT CHANGE UP (ref 0–0.9)
MONOCYTES NFR BLD AUTO: 7.6 % — SIGNIFICANT CHANGE UP (ref 2–14)
NEUTROPHILS # BLD AUTO: 3.05 K/UL — SIGNIFICANT CHANGE UP (ref 1.8–7.4)
NEUTROPHILS NFR BLD AUTO: 62.9 % — SIGNIFICANT CHANGE UP (ref 43–77)
NRBC # FLD: 0 K/UL — LOW (ref 25–125)
PLATELET # BLD AUTO: 247 K/UL — SIGNIFICANT CHANGE UP (ref 150–400)
PMV BLD: 11.3 FL — SIGNIFICANT CHANGE UP (ref 7–13)
POTASSIUM SERPL-MCNC: 3.3 MMOL/L — LOW (ref 3.5–5.3)
POTASSIUM SERPL-SCNC: 3.3 MMOL/L — LOW (ref 3.5–5.3)
PROT SERPL-MCNC: 7.3 G/DL — SIGNIFICANT CHANGE UP (ref 6–8.3)
RBC # BLD: 3.31 M/UL — LOW (ref 3.8–5.2)
RBC # FLD: 17.8 % — HIGH (ref 10.3–14.5)
RH IG SCN BLD-IMP: POSITIVE — SIGNIFICANT CHANGE UP
SODIUM SERPL-SCNC: 142 MMOL/L — SIGNIFICANT CHANGE UP (ref 135–145)
WBC # BLD: 4.85 K/UL — SIGNIFICANT CHANGE UP (ref 3.8–10.5)
WBC # FLD AUTO: 4.85 K/UL — SIGNIFICANT CHANGE UP (ref 3.8–10.5)

## 2019-01-31 PROCEDURE — 93010 ELECTROCARDIOGRAM REPORT: CPT

## 2019-01-31 RX ORDER — FOLIC ACID 0.8 MG
1 TABLET ORAL
Qty: 0 | Refills: 0 | COMMUNITY

## 2019-01-31 RX ORDER — LOSARTAN POTASSIUM 100 MG/1
1 TABLET, FILM COATED ORAL
Qty: 0 | Refills: 0 | COMMUNITY

## 2019-01-31 RX ORDER — NEBIVOLOL HYDROCHLORIDE 5 MG/1
20 TABLET ORAL
Qty: 0 | Refills: 0 | COMMUNITY

## 2019-01-31 RX ORDER — ASPIRIN/CALCIUM CARB/MAGNESIUM 324 MG
1 TABLET ORAL
Qty: 0 | Refills: 0 | COMMUNITY

## 2019-01-31 RX ORDER — PREGABALIN 225 MG/1
1 CAPSULE ORAL
Qty: 0 | Refills: 0 | COMMUNITY

## 2019-01-31 NOTE — H&P PST ADULT - PROBLEM SELECTOR PLAN 1
Patient is scheduled Bilateral revision of breast reconstruction, bilateral exchange of tissue expanders for implants bilateral muscle flap alloderm scheduled on 2/7/2019 with Dr. Huggins.    Preop instructions, pepcid, surgical scrub provided. Pt stated understanding.    Pending medical evaluation per surgeon - obtain for PST- Dr. Johan Viera [phone]-[phone].

## 2019-01-31 NOTE — H&P PST ADULT - ACTIVITY
30 minutes cardio x 5 days , weight lifting, soul cycle, , walking , climbing up and down stairs, shopping, ADLs

## 2019-01-31 NOTE — H&P PST ADULT - RS GEN PE MLT RESP DETAILS PC
breath sounds equal/no wheezes/no rales/no rhonchi/clear to auscultation bilaterally/respirations non-labored/airway patent/good air movement

## 2019-01-31 NOTE — H&P PST ADULT - NEGATIVE MUSCULOSKELETAL SYMPTOMS
no back pain/no leg pain L/no joint swelling/no myalgia/no muscle cramps/no muscle weakness/no neck pain/no leg pain R/no stiffness/no arm pain L/no arthritis

## 2019-01-31 NOTE — H&P PST ADULT - NEGATIVE GENERAL GENITOURINARY SYMPTOMS
no nocturia/no incontinence/no flank pain L/no flank pain R/no urine discoloration/no bladder infections/no urinary hesitancy/no dysuria/no hematuria/normal urinary frequency

## 2019-01-31 NOTE — H&P PST ADULT - NEGATIVE ENMT SYMPTOMS
no sinus symptoms/no ear pain/no vertigo/no throat pain/no dysphagia/no tinnitus/no nose bleeds/no recurrent cold sores/no dry mouth/no hearing difficulty/no nasal obstruction/no post-nasal discharge/no abnormal taste sensation/no gum bleeding/no nasal congestion

## 2019-01-31 NOTE — H&P PST ADULT - NEGATIVE NEUROLOGICAL SYMPTOMS
no confusion/no generalized seizures/no focal seizures/no headache/no difficulty walking/no hemiparesis/no syncope/no loss of sensation/no tremors/no vertigo/no weakness/no paresthesias/no transient paralysis

## 2019-01-31 NOTE — H&P PST ADULT - VISION (WITH CORRECTIVE LENSES IF THE PATIENT USUALLY WEARS THEM):
contact lenses/Partially impaired: cannot see medication labels or newsprint, but can see obstacles in path, and the surrounding layout; can count fingers at arm's length

## 2019-01-31 NOTE — H&P PST ADULT - HISTORY OF PRESENT ILLNESS
48 year old female with a history of intraductal carcinoma in situ of left breast, found on routine screening for mammogram 8/1 was abnormal. Patient is s/p -1st biopsy 8/17, 2nd biopsy 9/4. Patient is s/p b/l mastectomy with reconstruction surgery in October 2018. Patient presents to presurgical testing for a scheduled Bilateral revision of breast reconstruction, bilateral exchange of tissue expanders for implants bilateral muscle flap alloderm scheduled on 2/7/2019 with Dr. Huggins. Pre op diagnosis: Malignant neoplasm of unspecified site of unspecified female breast.

## 2019-01-31 NOTE — H&P PST ADULT - NEGATIVE GASTROINTESTINAL SYMPTOMS
no melena/no constipation/no diarrhea/no change in bowel habits/no vomiting/no nausea/no abdominal pain

## 2019-01-31 NOTE — H&P PST ADULT - NEGATIVE OPHTHALMOLOGIC SYMPTOMS
no blurred vision L/no diplopia/no discharge R/no irritation L/no irritation R/no pain R/no photophobia/no blurred vision R/no discharge L/no pain L

## 2019-01-31 NOTE — H&P PST ADULT - ASSESSMENT
Patient is scheduled Bilateral revision of breast reconstruction, bilateral exchange of tissue expanders for implants bilateral muscle flap alloderm scheduled on 2/7/2019 with Dr. Huggins. Pre op diagnosis: Malignant neoplasm of unspecified site of unspecified female breast.

## 2019-01-31 NOTE — H&P PST ADULT - PMH
Anemia    Endometriosis    Gall stones    H/O uterine leiomyoma    Hypertension  Feb 2018  Intraductal carcinoma in situ of breast    Malignant neoplasm of unspecified site of unspecified female breast

## 2019-01-31 NOTE — H&P PST ADULT - PSH
delivery in  for one of the twin and one twin  vaginal delivery -     h/o   x 2 - 20 yrs ago    H/O tubal ligation    History of cholecystectomy    History of surgery  Bilateral mastectomy and reconstruction surgery in 2018.  Status post partial hysterectomy  2012

## 2019-02-07 ENCOUNTER — RESULT REVIEW (OUTPATIENT)
Age: 49
End: 2019-02-07

## 2019-02-07 ENCOUNTER — OUTPATIENT (OUTPATIENT)
Dept: OUTPATIENT SERVICES | Facility: HOSPITAL | Age: 49
LOS: 1 days | Discharge: HOME | End: 2019-02-07
Payer: COMMERCIAL

## 2019-02-07 VITALS
TEMPERATURE: 97 F | SYSTOLIC BLOOD PRESSURE: 137 MMHG | OXYGEN SATURATION: 99 % | HEART RATE: 66 BPM | WEIGHT: 153 LBS | HEIGHT: 62 IN | DIASTOLIC BLOOD PRESSURE: 70 MMHG | RESPIRATION RATE: 14 BRPM

## 2019-02-07 VITALS
SYSTOLIC BLOOD PRESSURE: 139 MMHG | HEART RATE: 87 BPM | OXYGEN SATURATION: 98 % | RESPIRATION RATE: 12 BRPM | DIASTOLIC BLOOD PRESSURE: 77 MMHG

## 2019-02-07 DIAGNOSIS — Z98.890 OTHER SPECIFIED POSTPROCEDURAL STATES: Chronic | ICD-10-CM

## 2019-02-07 DIAGNOSIS — Z90.711 ACQUIRED ABSENCE OF UTERUS WITH REMAINING CERVICAL STUMP: Chronic | ICD-10-CM

## 2019-02-07 DIAGNOSIS — C50.919 MALIGNANT NEOPLASM OF UNSPECIFIED SITE OF UNSPECIFIED FEMALE BREAST: ICD-10-CM

## 2019-02-07 PROCEDURE — 19380 REVJ RECONSTRUCTED BREAST: CPT | Mod: 50,59

## 2019-02-07 PROCEDURE — 15734 MUSCLE-SKIN GRAFT TRUNK: CPT | Mod: 59,RT

## 2019-02-07 PROCEDURE — 19342 INSJ/RPLCMT BRST IMPLT SEP D: CPT | Mod: 50

## 2019-02-07 PROCEDURE — 88305 TISSUE EXAM BY PATHOLOGIST: CPT | Mod: 26

## 2019-02-07 NOTE — BRIEF OPERATIVE NOTE - PROCEDURE
<<-----Click on this checkbox to enter Procedure Removal of tissue expander and insertion of breast implant  02/07/2019    Active  [id number]

## 2019-02-07 NOTE — ASU DISCHARGE PLAN (ADULT/PEDIATRIC). - NOTIFY
Pain not relieved by Medications/Fever greater than 101/Swelling that continues/Persistent Nausea and Vomiting/Bleeding that does not stop/Inability to Tolerate Liquids or Foods

## 2019-02-07 NOTE — ASU DISCHARGE PLAN (ADULT/PEDIATRIC). - MEDICATION SUMMARY - MEDICATIONS TO TAKE
I will START or STAY ON the medications listed below when I get home from the hospital:    losartan 100 mg oral tablet  -- 1 tab(s) by mouth once a day  -- Indication: For home med    Bystolic 20 mg oral tablet  -- 1 tab(s) by mouth once a day  -- Indication: For home med    Vitamin C 500 mg oral tablet  -- 1 tab(s) by mouth once a day  -- Indication: For home med    Vitamin D3  -- 1 tab(s) by mouth once a day  -- Indication: For home med    vitamin E oral capsule  -- 1 tab(s) by mouth once a day- LD- 1/30/2019  -- Indication: For home med    folic acid 0.8 mg oral tablet  -- 1 tab(s) by mouth once a day  -- Indication: For home med    Vitamin B-12 100 mcg oral tablet  -- 1 tab(s) by mouth once a day  -- Indication: For home med

## 2019-02-07 NOTE — ASU PREOPERATIVE ASSESSMENT, ADULT (IPARK ONLY) - PROCEDURE
bilateral revision of breat reconstruction bilateral exchange of tissue expanders for implants bilateral muscle flap alloderm

## 2019-02-12 LAB — SURGICAL PATHOLOGY STUDY: SIGNIFICANT CHANGE UP

## 2019-04-10 ENCOUNTER — APPOINTMENT (OUTPATIENT)
Dept: SURGERY | Facility: CLINIC | Age: 49
End: 2019-04-10
Payer: COMMERCIAL

## 2019-04-10 PROCEDURE — 99213K: CUSTOM

## 2019-05-06 ENCOUNTER — OUTPATIENT (OUTPATIENT)
Dept: OUTPATIENT SERVICES | Facility: HOSPITAL | Age: 49
LOS: 1 days | End: 2019-05-06
Payer: COMMERCIAL

## 2019-05-06 VITALS
HEIGHT: 62 IN | SYSTOLIC BLOOD PRESSURE: 134 MMHG | WEIGHT: 158.07 LBS | RESPIRATION RATE: 15 BRPM | DIASTOLIC BLOOD PRESSURE: 84 MMHG | OXYGEN SATURATION: 99 % | TEMPERATURE: 98 F | HEART RATE: 76 BPM

## 2019-05-06 DIAGNOSIS — Z90.711 ACQUIRED ABSENCE OF UTERUS WITH REMAINING CERVICAL STUMP: Chronic | ICD-10-CM

## 2019-05-06 DIAGNOSIS — Z29.9 ENCOUNTER FOR PROPHYLACTIC MEASURES, UNSPECIFIED: ICD-10-CM

## 2019-05-06 DIAGNOSIS — C50.919 MALIGNANT NEOPLASM OF UNSPECIFIED SITE OF UNSPECIFIED FEMALE BREAST: ICD-10-CM

## 2019-05-06 DIAGNOSIS — Z98.890 OTHER SPECIFIED POSTPROCEDURAL STATES: Chronic | ICD-10-CM

## 2019-05-06 DIAGNOSIS — I10 ESSENTIAL (PRIMARY) HYPERTENSION: ICD-10-CM

## 2019-05-06 DIAGNOSIS — Z01.818 ENCOUNTER FOR OTHER PREPROCEDURAL EXAMINATION: ICD-10-CM

## 2019-05-06 DIAGNOSIS — Z41.1 ENCOUNTER FOR COSMETIC SURGERY: ICD-10-CM

## 2019-05-06 LAB
ANION GAP SERPL CALC-SCNC: 16 MMOL/L — SIGNIFICANT CHANGE UP (ref 5–17)
BLD GP AB SCN SERPL QL: NEGATIVE — SIGNIFICANT CHANGE UP
BUN SERPL-MCNC: 12 MG/DL — SIGNIFICANT CHANGE UP (ref 7–23)
CALCIUM SERPL-MCNC: 10.1 MG/DL — SIGNIFICANT CHANGE UP (ref 8.4–10.5)
CHLORIDE SERPL-SCNC: 102 MMOL/L — SIGNIFICANT CHANGE UP (ref 96–108)
CO2 SERPL-SCNC: 23 MMOL/L — SIGNIFICANT CHANGE UP (ref 22–31)
CREAT SERPL-MCNC: 0.56 MG/DL — SIGNIFICANT CHANGE UP (ref 0.5–1.3)
GLUCOSE SERPL-MCNC: 120 MG/DL — HIGH (ref 70–99)
HCT VFR BLD CALC: 33 % — LOW (ref 34.5–45)
HGB BLD-MCNC: 11 G/DL — LOW (ref 11.5–15.5)
MCHC RBC-ENTMCNC: 29.9 PG — SIGNIFICANT CHANGE UP (ref 27–34)
MCHC RBC-ENTMCNC: 33.3 GM/DL — SIGNIFICANT CHANGE UP (ref 32–36)
MCV RBC AUTO: 89.7 FL — SIGNIFICANT CHANGE UP (ref 80–100)
PLATELET # BLD AUTO: 201 K/UL — SIGNIFICANT CHANGE UP (ref 150–400)
POTASSIUM SERPL-MCNC: 3.8 MMOL/L — SIGNIFICANT CHANGE UP (ref 3.5–5.3)
POTASSIUM SERPL-SCNC: 3.8 MMOL/L — SIGNIFICANT CHANGE UP (ref 3.5–5.3)
RBC # BLD: 3.68 M/UL — LOW (ref 3.8–5.2)
RBC # FLD: 17.7 % — HIGH (ref 10.3–14.5)
RH IG SCN BLD-IMP: POSITIVE — SIGNIFICANT CHANGE UP
SODIUM SERPL-SCNC: 141 MMOL/L — SIGNIFICANT CHANGE UP (ref 135–145)
WBC # BLD: 6.25 K/UL — SIGNIFICANT CHANGE UP (ref 3.8–10.5)
WBC # FLD AUTO: 6.25 K/UL — SIGNIFICANT CHANGE UP (ref 3.8–10.5)

## 2019-05-06 PROCEDURE — G0463: CPT

## 2019-05-06 PROCEDURE — 85027 COMPLETE CBC AUTOMATED: CPT

## 2019-05-06 PROCEDURE — 99024 POSTOP FOLLOW-UP VISIT: CPT

## 2019-05-06 PROCEDURE — 86850 RBC ANTIBODY SCREEN: CPT

## 2019-05-06 PROCEDURE — 86901 BLOOD TYPING SEROLOGIC RH(D): CPT

## 2019-05-06 PROCEDURE — 80048 BASIC METABOLIC PNL TOTAL CA: CPT

## 2019-05-06 PROCEDURE — 86900 BLOOD TYPING SEROLOGIC ABO: CPT

## 2019-05-06 RX ORDER — CHLORHEXIDINE GLUCONATE 213 G/1000ML
1 SOLUTION TOPICAL DAILY
Refills: 0 | Status: DISCONTINUED | OUTPATIENT
Start: 2019-05-13 | End: 2019-05-13

## 2019-05-06 RX ORDER — CHOLECALCIFEROL (VITAMIN D3) 125 MCG
1 CAPSULE ORAL
Qty: 0 | Refills: 0 | COMMUNITY

## 2019-05-06 RX ORDER — LIDOCAINE HCL 20 MG/ML
0.2 VIAL (ML) INJECTION ONCE
Refills: 0 | Status: DISCONTINUED | OUTPATIENT
Start: 2019-05-13 | End: 2019-05-13

## 2019-05-06 RX ORDER — CEFAZOLIN SODIUM 1 G
2000 VIAL (EA) INJECTION ONCE
Refills: 0 | Status: DISCONTINUED | OUTPATIENT
Start: 2019-05-13 | End: 2019-05-14

## 2019-05-06 RX ORDER — SODIUM CHLORIDE 9 MG/ML
3 INJECTION INTRAMUSCULAR; INTRAVENOUS; SUBCUTANEOUS EVERY 8 HOURS
Refills: 0 | Status: DISCONTINUED | OUTPATIENT
Start: 2019-05-13 | End: 2019-05-13

## 2019-05-06 NOTE — H&P PST ADULT - ASSESSMENT
CAPRINI SCORE [CLOT updated 18]    AGE RELATED RISK FACTORS                                                       MOBILITY RELATED FACTORS  [x] Age 41-60 years                                            (1 Point)                    [ ] Bed rest                                                        (1 Point)  [ ] Age: 61-74 years                                           (2 Points)                  [ ] Plaster cast                                                   (2 Points)  [ ] Age= 75 years                                              (3 Points)                    [ ] Bed bound for more than 72 hours                 (2 Points)    DISEASE RELATED RISK FACTORS                                               GENDER SPECIFIC FACTORS  [ ] Edema in the lower extremities                       (1 Point)              [ ] Pregnancy                                                     (1 Point)  [ ] Varicose veins                                               (1 Point)                     [ ] Post-partum < 6 weeks                                   (1 Point)             [x] BMI > 25 Kg/m2                                            (1 Point)                     [ ] Hormonal therapy  or oral contraception          (1 Point)                 [ ] Sepsis (in the previous month)                        (1 Point)               [ ] History of pregnancy complications                 (1 point)  [ ] Pneumonia or serious lung disease                                               [ ] Unexplained or recurrent                     (1 Point)           (in the previous month)                               (1 Point)  [ ] Abnormal pulmonary function test                     (1 Point)                 SURGERY RELATED RISK FACTORS  [ ] Acute myocardial infarction                              (1 Point)               [ ]  Section                                             (1 Point)  [ ] Congestive heart failure (in the previous month)  (1 Point)      [ ] Minor surgery                                                  (1 Point)   [ ] Inflammatory bowel disease                             (1 Point)               [ ] Arthroscopic surgery                                        (2 Points)  [ ] Central venous access                                      (2 Points)                [x] General surgery lasting more than 45 minutes (2 points)  [x] Present or previous malignancy                     (2 Points)                [ ] Elective arthroplasty                                         (5 points)    [ ] Stroke (in the previous month)                          (5 Points)                                                                                                                                                           HEMATOLOGY RELATED FACTORS                                                 TRAUMA RELATED RISK FACTORS  [ ] Prior episodes of VTE                                     (3 Points)                [ ] Fracture of the hip, pelvis, or leg                       (5 Points)  [ ] Positive family history for VTE                         (3 Points)             [ ] Acute spinal cord injury (in the previous month)  (5 Points)  [ ] Prothrombin 89133 A                                     (3 Points)               [ ] Paralysis  (less than 1 month)                             (5 Points)  [ ] Factor V Leiden                                             (3 Points)                  [ ] Multiple Trauma within 1 month                        (5 Points)  [ ] Lupus anticoagulants                                     (3 Points)                                                           [ ] Anticardiolipin antibodies                               (3 Points)                                                       [ ] High homocysteine in the blood                      (3 Points)                                             [ ] Other congenital or acquired thrombophilia      (3 Points)                                                [ ] Heparin induced thrombocytopenia                  (3 Points)                                     Total Score [    6     ]

## 2019-05-06 NOTE — H&P PST ADULT - NSICDXPASTMEDICALHX_GEN_ALL_CORE_FT
PAST MEDICAL HISTORY:  Anemia on Iron Supplementation    Endometriosis     Gall stones     H/O uterine leiomyoma     Hypertension Feb 2018    Intraductal carcinoma in situ of breast     Malignant neoplasm of unspecified site of unspecified female breast PAST MEDICAL HISTORY:  Anemia on Iron Supplementation    Endometriosis     Gall stones     H/O uterine leiomyoma     Hypertension Feb 2018    Intraductal carcinoma in situ of breast     Malignant neoplasm of unspecified site of unspecified female breast     Malignant neoplasm of unspecified site of unspecified female breast PAST MEDICAL HISTORY:  Anemia on Iron Supplementation    Endometriosis     Gall stones     H/O uterine leiomyoma     Hypertension Feb 2018    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

## 2019-05-06 NOTE — H&P PST ADULT - NSICDXPROBLEM_GEN_ALL_CORE_FT
PROBLEM DIAGNOSES  Problem: Malignant neoplasm of unspecified site of unspecified female breast  Assessment and Plan: Scheduled abdominoplasty, revision of bilateral breast reconstruction, bilateral nipple reconstruction on 5/13/19  Pre-op education given to pt, including chlorhexidine soap  Lab work sent at [facility]    Problem: Need for prophylactic measure  Assessment and Plan: The Caprini score indicates that this patient is at high risk for a VTE event (score 6 or greater). Surgical patients in this group will benefit from both pharmacologic prophylaxis and intermittent compression devices.  The surgical team will determine the balance between VTE risk and bleeding risk, and other clinical considerations     Problem: Hypertension  Assessment and Plan: Instructed pt to continue BP meds PROBLEM DIAGNOSES  Problem: Malignant neoplasm of unspecified site of unspecified female breast  Assessment and Plan: Scheduled abdominoplasty, revision of bilateral breast reconstruction, bilateral nipple reconstruction on 5/13/19  Pre-op education given to pt, including chlorhexidine soap  Lab work sent at [facility]    Problem: Hypertension  Assessment and Plan: Instructed pt to continue BP meds     Problem: Need for prophylactic measure  Assessment and Plan: The Caprini score indicates that this patient is at high risk for a VTE event (score 6 or greater). Surgical patients in this group will benefit from both pharmacologic prophylaxis and intermittent compression devices.  The surgical team will determine the balance between VTE risk and bleeding risk, and other clinical considerations

## 2019-05-06 NOTE — H&P PST ADULT - NEGATIVE OPHTHALMOLOGIC SYMPTOMS
no blurred vision L/no discharge L/no photophobia/no blurred vision R/no discharge R/no irritation R/no diplopia/no pain L/no pain R/no irritation L

## 2019-05-06 NOTE — H&P PST ADULT - NEGATIVE GASTROINTESTINAL SYMPTOMS
no nausea/no change in bowel habits/no abdominal pain/no melena/no vomiting/no constipation/no diarrhea

## 2019-05-06 NOTE — H&P PST ADULT - NSICDXPASTSURGICALHX_GEN_ALL_CORE_FT
PAST SURGICAL HISTORY:   delivery in  for one of the twin and one twin  vaginal delivery -      h/o   x 2 - 20 yrs ago     H/O tubal ligation     History of cholecystectomy     History of surgery Bilateral mastectomy and reconstruction surgery in .    Status post partial hysterectomy 2012 PAST SURGICAL HISTORY:   delivery in  for one of the twin and one twin  vaginal delivery -      h/o   x 2 - 20 yrs ago     H/O breast biopsy x 2 (2018, 2018)    H/O tubal ligation     History of cholecystectomy     History of surgery Bilateral mastectomy and reconstruction surgery in 10/2018    S/P breast reconstruction, bilateral with implant exchange,, 2019    Status post partial hysterectomy 2012

## 2019-05-06 NOTE — H&P PST ADULT - ACTIVITY
30 minutes cardio x 5 days , weight lifting, soul cycle, , walking , climbing up and down stairs, shopping, ADLs ADLs, 30 minutes cardio x 5 days , weight lifting, soul cycle, , walking, climbing up and down stairs, shopping, strenuous activities ADLs, cardio, light weight lifting, soul cycle, , walking, climbs steps, shopping, strenuous activities

## 2019-05-06 NOTE — H&P PST ADULT - BREASTS COMMENTS
Pre op diagnosis: Malignant neoplasm of unspecified site of unspecified female breast s/p bilateral mastectomies with reconstruction, pt reports some scarring

## 2019-05-06 NOTE — H&P PST ADULT - NEGATIVE ENMT SYMPTOMS
no hearing difficulty/no nasal congestion/no tinnitus/no gum bleeding/no throat pain/no dysphagia/no vertigo/no sinus symptoms/no post-nasal discharge/no nasal obstruction/no nose bleeds/no dry mouth/no ear pain/no recurrent cold sores/no abnormal taste sensation

## 2019-05-06 NOTE — H&P PST ADULT - NEGATIVE NEUROLOGICAL SYMPTOMS
no focal seizures/no syncope/no loss of sensation/no difficulty walking/no transient paralysis/no confusion/no paresthesias/no tremors/no vertigo/no weakness/no generalized seizures/no headache/no hemiparesis

## 2019-05-06 NOTE — H&P PST ADULT - RS GEN PE MLT RESP DETAILS PC
no rhonchi/no wheezes/respirations non-labored/airway patent/clear to auscultation bilaterally/breath sounds equal/good air movement/no rales no rales/respirations non-labored/no wheezes/no rhonchi/clear to auscultation bilaterally

## 2019-05-06 NOTE — H&P PST ADULT - NSICDXFAMILYHX_GEN_ALL_CORE_FT
FAMILY HISTORY:  Father  Still living? Unknown  Family history of hypertension, Age at diagnosis: Age Unknown    Mother  Still living? Yes, Estimated age: Age Unknown  Family history of hypertension, Age at diagnosis: Age Unknown

## 2019-05-06 NOTE — H&P PST ADULT - REASON FOR ADMISSION
" I am having bilateral revision of my breast and reconstructive surgery" "bilateral revision of my breast and reconstructive nipple surgery"

## 2019-05-06 NOTE — H&P PST ADULT - NEGATIVE MUSCULOSKELETAL SYMPTOMS
no muscle cramps/no muscle weakness/no neck pain/no myalgia/no arm pain L/no stiffness/no leg pain R/no joint swelling/no back pain/no leg pain L/no arthritis

## 2019-05-06 NOTE — H&P PST ADULT - VISION (WITH CORRECTIVE LENSES IF THE PATIENT USUALLY WEARS THEM):
contact lenses/Partially impaired: cannot see medication labels or newsprint, but can see obstacles in path, and the surrounding layout; can count fingers at arm's length contact lenses/Normal vision: sees adequately in most situations; can see medication labels, newsprint

## 2019-05-06 NOTE — H&P PST ADULT - NSANTHOSAYNRD_GEN_A_CORE
No. MACHO screening performed.  STOP BANG Legend: 0-2 = LOW Risk; 3-4 = INTERMEDIATE Risk; 5-8 = HIGH Risk

## 2019-05-06 NOTE — H&P PST ADULT - PRIMARY CARE PROVIDER
The defibrillation pads were placed in the anterior/lateral position.   Johan Viera ([facility]) [phone], appt 4/22

## 2019-05-06 NOTE — H&P PST ADULT - NEGATIVE GENERAL GENITOURINARY SYMPTOMS
no flank pain L/no urine discoloration/no dysuria/no flank pain R/no urinary hesitancy/normal urinary frequency/no nocturia/no hematuria/no bladder infections

## 2019-05-07 PROBLEM — D05.10 INTRADUCTAL CARCINOMA IN SITU OF UNSPECIFIED BREAST: Chronic | Status: ACTIVE | Noted: 2018-09-06

## 2019-05-07 PROBLEM — C50.919 MALIGNANT NEOPLASM OF UNSPECIFIED SITE OF UNSPECIFIED FEMALE BREAST: Chronic | Status: INACTIVE | Noted: 2019-01-31 | Resolved: 2019-05-06

## 2019-05-13 ENCOUNTER — OUTPATIENT (OUTPATIENT)
Dept: INPATIENT UNIT | Facility: HOSPITAL | Age: 49
LOS: 1 days | End: 2019-05-13
Payer: COMMERCIAL

## 2019-05-13 VITALS
RESPIRATION RATE: 20 BRPM | SYSTOLIC BLOOD PRESSURE: 135 MMHG | HEART RATE: 81 BPM | TEMPERATURE: 98 F | HEIGHT: 62 IN | DIASTOLIC BLOOD PRESSURE: 83 MMHG | WEIGHT: 158.07 LBS | OXYGEN SATURATION: 97 %

## 2019-05-13 DIAGNOSIS — Z98.890 OTHER SPECIFIED POSTPROCEDURAL STATES: Chronic | ICD-10-CM

## 2019-05-13 DIAGNOSIS — Z41.1 ENCOUNTER FOR COSMETIC SURGERY: ICD-10-CM

## 2019-05-13 DIAGNOSIS — C50.919 MALIGNANT NEOPLASM OF UNSPECIFIED SITE OF UNSPECIFIED FEMALE BREAST: ICD-10-CM

## 2019-05-13 DIAGNOSIS — Z90.711 ACQUIRED ABSENCE OF UTERUS WITH REMAINING CERVICAL STUMP: Chronic | ICD-10-CM

## 2019-05-13 LAB — RH IG SCN BLD-IMP: POSITIVE — SIGNIFICANT CHANGE UP

## 2019-05-13 RX ORDER — OXYCODONE HYDROCHLORIDE 5 MG/1
5 TABLET ORAL EVERY 4 HOURS
Refills: 0 | Status: DISCONTINUED | OUTPATIENT
Start: 2019-05-13 | End: 2019-05-14

## 2019-05-13 RX ORDER — ONDANSETRON 8 MG/1
4 TABLET, FILM COATED ORAL EVERY 8 HOURS
Refills: 0 | Status: DISCONTINUED | OUTPATIENT
Start: 2019-05-13 | End: 2019-05-14

## 2019-05-13 RX ORDER — DIAZEPAM 5 MG
5 TABLET ORAL EVERY 6 HOURS
Refills: 0 | Status: DISCONTINUED | OUTPATIENT
Start: 2019-05-13 | End: 2019-05-14

## 2019-05-13 RX ORDER — BENZOCAINE AND MENTHOL 5; 1 G/100ML; G/100ML
1 LIQUID ORAL THREE TIMES A DAY
Refills: 0 | Status: DISCONTINUED | OUTPATIENT
Start: 2019-05-13 | End: 2019-05-14

## 2019-05-13 RX ORDER — NEBIVOLOL HYDROCHLORIDE 5 MG/1
20 TABLET ORAL DAILY
Refills: 0 | Status: DISCONTINUED | OUTPATIENT
Start: 2019-05-13 | End: 2019-05-14

## 2019-05-13 RX ORDER — ONDANSETRON 8 MG/1
4 TABLET, FILM COATED ORAL ONCE
Refills: 0 | Status: DISCONTINUED | OUTPATIENT
Start: 2019-05-13 | End: 2019-05-13

## 2019-05-13 RX ORDER — HYDROMORPHONE HYDROCHLORIDE 2 MG/ML
0.5 INJECTION INTRAMUSCULAR; INTRAVENOUS; SUBCUTANEOUS
Refills: 0 | Status: DISCONTINUED | OUTPATIENT
Start: 2019-05-13 | End: 2019-05-13

## 2019-05-13 RX ORDER — ENOXAPARIN SODIUM 100 MG/ML
40 INJECTION SUBCUTANEOUS DAILY
Refills: 0 | Status: DISCONTINUED | OUTPATIENT
Start: 2019-05-14 | End: 2019-05-14

## 2019-05-13 RX ORDER — OXYCODONE HYDROCHLORIDE 5 MG/1
10 TABLET ORAL EVERY 4 HOURS
Refills: 0 | Status: DISCONTINUED | OUTPATIENT
Start: 2019-05-13 | End: 2019-05-14

## 2019-05-13 RX ORDER — ENOXAPARIN SODIUM 100 MG/ML
40 INJECTION SUBCUTANEOUS ONCE
Refills: 0 | Status: COMPLETED | OUTPATIENT
Start: 2019-05-13 | End: 2019-05-13

## 2019-05-13 RX ORDER — ACETAMINOPHEN 500 MG
650 TABLET ORAL EVERY 6 HOURS
Refills: 0 | Status: DISCONTINUED | OUTPATIENT
Start: 2019-05-13 | End: 2019-05-14

## 2019-05-13 RX ORDER — SODIUM CHLORIDE 9 MG/ML
1000 INJECTION, SOLUTION INTRAVENOUS
Refills: 0 | Status: DISCONTINUED | OUTPATIENT
Start: 2019-05-13 | End: 2019-05-14

## 2019-05-13 RX ORDER — DIPHENHYDRAMINE HCL 50 MG
25 CAPSULE ORAL EVERY 4 HOURS
Refills: 0 | Status: DISCONTINUED | OUTPATIENT
Start: 2019-05-13 | End: 2019-05-14

## 2019-05-13 RX ORDER — CALCIUM CARBONATE 500(1250)
1 TABLET ORAL THREE TIMES A DAY
Refills: 0 | Status: DISCONTINUED | OUTPATIENT
Start: 2019-05-13 | End: 2019-05-14

## 2019-05-13 RX ORDER — SODIUM CHLORIDE 9 MG/ML
1000 INJECTION, SOLUTION INTRAVENOUS
Refills: 0 | Status: DISCONTINUED | OUTPATIENT
Start: 2019-05-13 | End: 2019-05-13

## 2019-05-13 RX ORDER — CEFAZOLIN SODIUM 1 G
2000 VIAL (EA) INJECTION EVERY 8 HOURS
Refills: 0 | Status: DISCONTINUED | OUTPATIENT
Start: 2019-05-13 | End: 2019-05-14

## 2019-05-13 RX ADMIN — OXYCODONE HYDROCHLORIDE 10 MILLIGRAM(S): 5 TABLET ORAL at 20:02

## 2019-05-13 RX ADMIN — Medication 100 MILLIGRAM(S): at 19:21

## 2019-05-13 RX ADMIN — OXYCODONE HYDROCHLORIDE 10 MILLIGRAM(S): 5 TABLET ORAL at 20:32

## 2019-05-13 RX ADMIN — ONDANSETRON 4 MILLIGRAM(S): 8 TABLET, FILM COATED ORAL at 16:30

## 2019-05-13 RX ADMIN — ENOXAPARIN SODIUM 40 MILLIGRAM(S): 100 INJECTION SUBCUTANEOUS at 19:21

## 2019-05-13 RX ADMIN — BENZOCAINE AND MENTHOL 1 LOZENGE: 5; 1 LIQUID ORAL at 20:41

## 2019-05-13 RX ADMIN — SODIUM CHLORIDE 100 MILLILITER(S): 9 INJECTION, SOLUTION INTRAVENOUS at 14:30

## 2019-05-13 NOTE — PATIENT PROFILE ADULT - VISION (WITH CORRECTIVE LENSES IF THE PATIENT USUALLY WEARS THEM):
contact lenses/Normal vision: sees adequately in most situations; can see medication labels, newsprint

## 2019-05-13 NOTE — BRIEF OPERATIVE NOTE - NSICDXBRIEFPREOP_GEN_ALL_CORE_FT
PRE-OP DIAGNOSIS:  Rectus diastasis 13-May-2019 13:40:04  Antione Owusu  History of breast reconstruction 13-May-2019 13:39:52  Antione Owusu

## 2019-05-13 NOTE — BRIEF OPERATIVE NOTE - OPERATION/FINDINGS
abdominoplasty with flank liposuction/fat harvest; fat grafting to b/l breasts; b/l NAC recon with skin graft from discarded abdominoplasty specimens

## 2019-05-13 NOTE — BRIEF OPERATIVE NOTE - NSICDXBRIEFPOSTOP_GEN_ALL_CORE_FT
POST-OP DIAGNOSIS:  Rectus diastasis 13-May-2019 13:40:30  Antione Owusu  History of breast reconstruction 13-May-2019 13:40:18  Antione Owusu

## 2019-05-14 ENCOUNTER — TRANSCRIPTION ENCOUNTER (OUTPATIENT)
Age: 49
End: 2019-05-14

## 2019-05-14 VITALS
SYSTOLIC BLOOD PRESSURE: 118 MMHG | HEART RATE: 96 BPM | RESPIRATION RATE: 18 BRPM | DIASTOLIC BLOOD PRESSURE: 79 MMHG | TEMPERATURE: 98 F | OXYGEN SATURATION: 98 %

## 2019-05-14 PROCEDURE — 19350 NIPPLE/AREOLA RECONSTRUCTION: CPT | Mod: 50

## 2019-05-14 PROCEDURE — 19380 REVJ RECONSTRUCTED BREAST: CPT | Mod: 50

## 2019-05-14 PROCEDURE — 15847 EXC SKIN ABD ADD-ON: CPT

## 2019-05-14 PROCEDURE — 86900 BLOOD TYPING SEROLOGIC ABO: CPT

## 2019-05-14 PROCEDURE — 15830 EXC EXCESSIVE SKIN ABDOMEN: CPT

## 2019-05-14 PROCEDURE — 86901 BLOOD TYPING SEROLOGIC RH(D): CPT

## 2019-05-14 RX ORDER — LOSARTAN POTASSIUM 100 MG/1
100 TABLET, FILM COATED ORAL DAILY
Refills: 0 | Status: DISCONTINUED | OUTPATIENT
Start: 2019-05-14 | End: 2019-05-14

## 2019-05-14 RX ORDER — ENOXAPARIN SODIUM 100 MG/ML
40 INJECTION SUBCUTANEOUS
Qty: 160 | Refills: 0
Start: 2019-05-14 | End: 2019-05-17

## 2019-05-14 RX ORDER — SENNA PLUS 8.6 MG/1
1 TABLET ORAL DAILY
Refills: 0 | Status: DISCONTINUED | OUTPATIENT
Start: 2019-05-14 | End: 2019-05-14

## 2019-05-14 RX ORDER — BENZOCAINE AND MENTHOL 5; 1 G/100ML; G/100ML
1 LIQUID ORAL
Refills: 0 | Status: DISCONTINUED | OUTPATIENT
Start: 2019-05-14 | End: 2019-05-14

## 2019-05-14 RX ORDER — ACETAMINOPHEN 500 MG
2 TABLET ORAL
Qty: 0 | Refills: 0 | DISCHARGE
Start: 2019-05-14

## 2019-05-14 RX ORDER — DOCUSATE SODIUM 100 MG
100 CAPSULE ORAL DAILY
Refills: 0 | Status: DISCONTINUED | OUTPATIENT
Start: 2019-05-14 | End: 2019-05-14

## 2019-05-14 RX ORDER — ENOXAPARIN SODIUM 100 MG/ML
40 INJECTION SUBCUTANEOUS DAILY
Refills: 0 | Status: DISCONTINUED | OUTPATIENT
Start: 2019-05-14 | End: 2019-05-14

## 2019-05-14 RX ADMIN — Medication 5 MILLIGRAM(S): at 15:25

## 2019-05-14 RX ADMIN — Medication 100 MILLIGRAM(S): at 11:32

## 2019-05-14 RX ADMIN — BENZOCAINE AND MENTHOL 1 LOZENGE: 5; 1 LIQUID ORAL at 00:26

## 2019-05-14 RX ADMIN — Medication 100 MILLIGRAM(S): at 03:08

## 2019-05-14 RX ADMIN — Medication 5 MILLIGRAM(S): at 09:15

## 2019-05-14 RX ADMIN — Medication 650 MILLIGRAM(S): at 06:22

## 2019-05-14 RX ADMIN — Medication 650 MILLIGRAM(S): at 06:52

## 2019-05-14 RX ADMIN — NEBIVOLOL HYDROCHLORIDE 20 MILLIGRAM(S): 5 TABLET ORAL at 09:17

## 2019-05-14 RX ADMIN — Medication 650 MILLIGRAM(S): at 12:34

## 2019-05-14 RX ADMIN — Medication 650 MILLIGRAM(S): at 13:05

## 2019-05-14 NOTE — DISCHARGE NOTE NURSING/CASE MANAGEMENT/SOCIAL WORK - NSDCDPATPORTLINK_GEN_ALL_CORE
You can access the [facility] Portal, offered by [facility], by registering with the following website: [email or web address]

## 2019-05-14 NOTE — DISCHARGE NOTE NURSING/CASE MANAGEMENT/SOCIAL WORK - NSDCPNINST_GEN_ALL_CORE
If unable to tolerate diet, nausea, vomiting, fever above 100.4 F, chills, or an increase in pain, notify provider or return to ER

## 2019-05-14 NOTE — PROGRESS NOTE ADULT - ASSESSMENT
49 y/o female with PMHx of HTN, intraductal carcinoma in situ of left breast, now POD1 from abdominoplasty, bilateral breast reconstruction, bilateral nipple areolar reconstruction     PLAN  - Restart home losartan  - Add cepacol lozenges  - Continue valium prn and remainder of prn pain contrl  - Discontinue marie and f/u TOV  - OOB as tolerated with assistance  - regular diet  - cont daily lovenox injections for total of 5 postop days  - DC planning for later today    Plastic Surgery  [id number]

## 2019-05-14 NOTE — DISCHARGE NOTE PROVIDER - HOSPITAL COURSE
47 y/o female with PMHx of HTN, intraductal carcinoma in situ of left breast, found on routine screening for mammogram 8/1/18. s/p -1st biopsy 8/17, 2nd biopsy 9/4- s/p b/l mastectomy with reconstructive surgery 10/2018, s/p bilateral revision of breast reconstruction, bilateral exchange of tissue expanders for implants on 2/7/2019. She was taken to the operating room on 5/13/19 for an abdominoplasty, bilateral breast reconstruction, bilateral nipple areolar reconstruction with Dr. Huggins.  She tolerated the procedure well and was transferred to PACU and then the floor without any issues.     On POD1 she continued to recover appropriately on the floor.  The marie catheter was removed and she voided appropriately. She was tolerating a regular diet and her pain was well-controlled so she was deemed stable for discharge home. She was discharged home with instructions to continue Lovenox injections for a total of 6 days. 47 y/o female with PMHx of HTN, intraductal carcinoma in situ of left breast, found on routine screening for mammogram 8/1/18. s/p -1st biopsy 8/17, 2nd biopsy 9/4- s/p b/l mastectomy with reconstructive surgery 10/2018, s/p bilateral revision of breast reconstruction, bilateral exchange of tissue expanders for implants on 2/7/2019. She was taken to the operating room on 5/13/19 for an abdominoplasty, bilateral breast reconstruction, bilateral nipple areolar reconstruction with Dr. Huggins.  She tolerated the procedure well and was transferred to PACU and then the floor without any issues.     On POD1 she continued to recover appropriately on the floor.  The marie catheter was removed and she voided appropriately. She was tolerating a regular diet and her pain was well-controlled so she was deemed stable for discharge home. She had private nursing services already set up for discharge. She was discharged home with instructions to continue Lovenox injections for a total of 6 days and to keep the dressings in place until her follow-up with Dr. Huggins.

## 2019-05-14 NOTE — DISCHARGE NOTE PROVIDER - NSDCCPCAREPLAN_GEN_ALL_CORE_FT
PRINCIPAL DISCHARGE DIAGNOSIS  Diagnosis: History of breast cancer  Assessment and Plan of Treatment:       SECONDARY DISCHARGE DIAGNOSES  Diagnosis: History of breast reconstruction  Assessment and Plan of Treatment: PRINCIPAL DISCHARGE DIAGNOSIS  Diagnosis: History of breast cancer  Assessment and Plan of Treatment: See discharge instructions      SECONDARY DISCHARGE DIAGNOSES  Diagnosis: History of breast reconstruction  Assessment and Plan of Treatment:

## 2019-05-14 NOTE — DISCHARGE NOTE PROVIDER - NSDCFUADDINST_GEN_ALL_CORE_FT
WOUND CARE:    You will be discharged with ANIBAL drains. You will need to empty them and record outputs accurately. This will be taught to you by the nursing staff. Please do not remove the ANIBAL drains. They will be removed in the office. Please bring to the office accurate records of output.   MEDICATIONS: You may continue to take over the counter tylenol for pain control. As well as oxycodone for breakthrough pain control. Please continue self-administering the Lovenox injections through 5/18/19.  BATHING: Please do not submerge wound underwater. You may shower and/or sponge bathe.  ACTIVITY: No heavy lifting or straining. Otherwise, you may return to your usual level of physical activity. If you are taking narcotic pain medication (such as oxycodone) DO NOT drive a car, operate machinery or make important decisions.  DIET: Return to your usual diet.  NOTIFY YOUR SURGEON IF: You have any bleeding that does not stop, any pus draining from your wound(s), any fever (over 100.4 F) or chills, persistent nausea/vomiting, persistent diarrhea, or if your pain is not controlled on your discharge pain medications.  FOLLOW-UP: Please follow-up with Dr. Huggins in the office, call the office to make and appointment.  Please follow up with your primary care physician in one week regarding your hospitalization WOUND CARE: Keep the dressings in place until you first follow-up visit with Dr. Huggins.   You will be discharged with ANIBAL drains. You will need to empty them and record outputs accurately. This will be taught to you by the nursing staff. Please do not remove the ANIBAL drains. They will be removed in the office. Please bring to the office accurate records of output.   MEDICATIONS: You may continue to take over the counter tylenol for pain control. As well as oxycodone for breakthrough pain control. Please continue self-administering the Lovenox injections through 5/18/19.  ACTIVITY: No heavy lifting or straining. Otherwise, you may return to your usual level of physical activity. If you are taking narcotic pain medication (such as oxycodone) or valium, DO NOT drive a car, operate machinery or make important decisions.  DIET: Return to your usual diet.  NOTIFY YOUR SURGEON IF: You have any bleeding that does not stop, any pus draining from your wound(s), any fever (over 100.4 F) or chills, persistent nausea/vomiting, persistent diarrhea, or if your pain is not controlled on your discharge pain medications.  FOLLOW-UP: Please follow-up with Dr. Huggins in the office within one week of discharge, call the office to make and appointment.  Please follow up with your primary care physician in one week regarding your hospitalization

## 2019-05-14 NOTE — DISCHARGE NOTE PROVIDER - NSDCCPTREATMENT_GEN_ALL_CORE_FT
PRINCIPAL PROCEDURE  Procedure: Combined abdominoplasty with liposuction of flank  Findings and Treatment:       SECONDARY PROCEDURE  Procedure: Revision of reconstruction of breast using fat graft  Findings and Treatment:

## 2019-05-14 NOTE — PROGRESS NOTE ADULT - SUBJECTIVE AND OBJECTIVE BOX
Plastic Surgery Progress Note    SUBJECTIVE: Patient seen and examined at the bedside.  No acute overnight events. Only complaint from overnight was feeling cold. She is feeling well this morning. Tolerating regular diet without nausea or vomiting. Pain is well controlled. Has not been out of bed.    VITALS  T(C): 37.5 (05-14-19 @ 01:48), Max: 37.5 (05-14-19 @ 01:48)  HR: 99 (05-14-19 @ 01:48) (77 - 99)  BP: 101/70 (05-14-19 @ 01:48) (101/70 - 141/73)  RR: 16 (05-14-19 @ 01:48) (16 - 815)  SpO2: 100% (05-14-19 @ 01:48) (95% - 100%)    Is/Os    05-13 @ 07:01  -  05-14 @ 06:27  --------------------------------------------------------  IN:    IV PiggyBack: 50 mL    lactated ringers.: 1400 mL    Oral Fluid: 140 mL  Total IN: 1590 mL    OUT:    Bulb: 105 mL    Bulb: 35 mL    Indwelling Catheter - Urethral: 900 mL  Total OUT: 1040 mL    Total NET: 550 mL    PHYSICAL EXAM:  General: NAD, Lying in bed comfortably, alert, oriented x3  Pulm: Non-labored breathing on RA  Breasts: soft, appropriately tender, no fluid collections, no bleeding, mild soft tissue swelling, bilateral xeroform areolar dressings and bra in place    Abd: Abdominal binder in place, lower abdominal incision is clean and intact the overlying gauze/ABD dressing is clean and dry, moderate soft tissue swelling, appropriately tender, no erythema, ANIBAL drains x2 in place with serosanguinous drainage     MEDICATIONS (STANDING): ceFAZolin   IVPB 2000 milliGRAM(s) IV Intermittent every 8 hours  ceFAZolin   IVPB 2000 milliGRAM(s) IV Intermittent once  enoxaparin Injectable 40 milliGRAM(s) SubCutaneous daily  lactated ringers. 1000 milliLiter(s) IV Continuous <Continuous>  nebivolol 20 milliGRAM(s) Oral daily    MEDICATIONS (PRN):acetaminophen   Tablet .. 650 milliGRAM(s) Oral every 6 hours PRN Temp greater or equal to 38C (100.4F), Mild Pain (1 - 3)  calcium carbonate    500 mG (Tums) Chewable 1 Tablet(s) Chew three times a day PRN Dyspepsia  diazepam    Tablet 5 milliGRAM(s) Oral every 6 hours PRN muscle spasm  diphenhydrAMINE 25 milliGRAM(s) Oral every 4 hours PRN Rash and/or Itching  ondansetron Injectable 4 milliGRAM(s) IV Push every 8 hours PRN Nausea and/or Vomiting  oxyCODONE    IR 5 milliGRAM(s) Oral every 4 hours PRN Moderate Pain (4 - 6)  oxyCODONE    IR 10 milliGRAM(s) Oral every 4 hours PRN Severe Pain (7 - 10)

## 2019-05-14 NOTE — DISCHARGE NOTE PROVIDER - CARE PROVIDER_API CALL
Jagdish Huggins)  Plastic Surgery  [address] [place] [address], [address]  [place] [address]  Phone: [phone]  Fax: [phone]  Follow Up Time:

## 2019-08-26 PROBLEM — C50.919 MALIGNANT NEOPLASM OF UNSPECIFIED SITE OF UNSPECIFIED FEMALE BREAST: Chronic | Status: ACTIVE | Noted: 2019-05-06

## 2019-09-21 ENCOUNTER — OUTPATIENT (OUTPATIENT)
Dept: OUTPATIENT SERVICES | Facility: HOSPITAL | Age: 49
LOS: 1 days | End: 2019-09-21

## 2019-09-21 VITALS
HEIGHT: 62.25 IN | RESPIRATION RATE: 16 BRPM | WEIGHT: 164.91 LBS | DIASTOLIC BLOOD PRESSURE: 90 MMHG | HEART RATE: 67 BPM | SYSTOLIC BLOOD PRESSURE: 142 MMHG | OXYGEN SATURATION: 99 % | TEMPERATURE: 97 F

## 2019-09-21 DIAGNOSIS — Z98.890 OTHER SPECIFIED POSTPROCEDURAL STATES: Chronic | ICD-10-CM

## 2019-09-21 DIAGNOSIS — Z41.1 ENCOUNTER FOR COSMETIC SURGERY: ICD-10-CM

## 2019-09-21 DIAGNOSIS — Z90.711 ACQUIRED ABSENCE OF UTERUS WITH REMAINING CERVICAL STUMP: Chronic | ICD-10-CM

## 2019-09-21 LAB
ANION GAP SERPL CALC-SCNC: 17 MMO/L — HIGH (ref 7–14)
BUN SERPL-MCNC: 14 MG/DL — SIGNIFICANT CHANGE UP (ref 7–23)
CALCIUM SERPL-MCNC: 9.5 MG/DL — SIGNIFICANT CHANGE UP (ref 8.4–10.5)
CHLORIDE SERPL-SCNC: 100 MMOL/L — SIGNIFICANT CHANGE UP (ref 98–107)
CO2 SERPL-SCNC: 23 MMOL/L — SIGNIFICANT CHANGE UP (ref 22–31)
CREAT SERPL-MCNC: 0.61 MG/DL — SIGNIFICANT CHANGE UP (ref 0.5–1.3)
GLUCOSE SERPL-MCNC: 89 MG/DL — SIGNIFICANT CHANGE UP (ref 70–99)
HCT VFR BLD CALC: 30.3 % — LOW (ref 34.5–45)
HGB BLD-MCNC: 10.1 G/DL — LOW (ref 11.5–15.5)
MCHC RBC-ENTMCNC: 29.8 PG — SIGNIFICANT CHANGE UP (ref 27–34)
MCHC RBC-ENTMCNC: 33.3 % — SIGNIFICANT CHANGE UP (ref 32–36)
MCV RBC AUTO: 89.4 FL — SIGNIFICANT CHANGE UP (ref 80–100)
NRBC # FLD: 0 K/UL — SIGNIFICANT CHANGE UP (ref 0–0)
PLATELET # BLD AUTO: 207 K/UL — SIGNIFICANT CHANGE UP (ref 150–400)
PMV BLD: 11.2 FL — SIGNIFICANT CHANGE UP (ref 7–13)
POTASSIUM SERPL-MCNC: 3.7 MMOL/L — SIGNIFICANT CHANGE UP (ref 3.5–5.3)
POTASSIUM SERPL-SCNC: 3.7 MMOL/L — SIGNIFICANT CHANGE UP (ref 3.5–5.3)
RBC # BLD: 3.39 M/UL — LOW (ref 3.8–5.2)
RBC # FLD: 17.5 % — HIGH (ref 10.3–14.5)
SODIUM SERPL-SCNC: 140 MMOL/L — SIGNIFICANT CHANGE UP (ref 135–145)
WBC # BLD: 5.85 K/UL — SIGNIFICANT CHANGE UP (ref 3.8–10.5)
WBC # FLD AUTO: 5.85 K/UL — SIGNIFICANT CHANGE UP (ref 3.8–10.5)

## 2019-09-21 RX ORDER — VITAMIN E 100 UNIT
1 CAPSULE ORAL
Qty: 0 | Refills: 0 | DISCHARGE

## 2019-09-21 RX ORDER — PREGABALIN 225 MG/1
1 CAPSULE ORAL
Qty: 0 | Refills: 0 | DISCHARGE

## 2019-09-21 NOTE — H&P PST ADULT - HISTORY OF PRESENT ILLNESS
49 y/o female with PMHx of HTN, intraductal carcinoma in situ of left breast, found on routine screening for mammogram 8/1/18. s/p -1st biopsy 8/17, 2nd biopsy 9/4- s/p b/l mastectomy with reconstructive surgery 10/2018, s/p bilateral revision of breast reconstruction, bilateral exchange of tissue expanders for implants on 2/7/2019- evaluated by Dr. Huggins. Presents to PST for a scheduled abdominoplasty, bilateral breast reconstruction, bilateral nipple areolar reconstruction 5/13/2019. 49 y/o female with PMHx of HTN, intraductal carcinoma in situ of left breast, found on routine screening for mammogram 8/1/18. s/p -1st biopsy 8/17, 2nd biopsy 9/4- s/p b/l mastectomy with reconstructive surgery 10/2018, s/p bilateral revision of breast reconstruction, bilateral exchange of tissue expanders for implants on 2/7/2019.  H/o abdominoplasty, bilateral breast reconstruction, bilateral nipple areolar reconstruction 5/13/2019.   Now scheduled for suction assisted lipectomy upper arms revision of b/l breast reconstruction, b/l suction assisted lipectomy of the b/l breast reconstruction for symmetrizing on 10/3/2019

## 2019-09-21 NOTE — H&P PST ADULT - NEGATIVE ENMT SYMPTOMS
no dysphagia/no ear pain/no tinnitus/no throat pain/no recurrent cold sores/no abnormal taste sensation/no hearing difficulty/no nose bleeds/no gum bleeding/no dry mouth/no sinus symptoms/no nasal obstruction/no vertigo/no nasal congestion/no post-nasal discharge

## 2019-09-21 NOTE — H&P PST ADULT - NEGATIVE NEUROLOGICAL SYMPTOMS
no transient paralysis/no paresthesias/no generalized seizures/no confusion/no syncope/no focal seizures/no tremors/no loss of sensation/no headache/no hemiparesis/no weakness/no vertigo/no difficulty walking

## 2019-09-21 NOTE — H&P PST ADULT - NSICDXPASTMEDICALHX_GEN_ALL_CORE_FT
PAST MEDICAL HISTORY:  Anemia on Iron Supplementation    Endometriosis     Gall stones     H/O uterine leiomyoma     Hypertension Feb 2018    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

## 2019-09-21 NOTE — H&P PST ADULT - NEGATIVE GENERAL GENITOURINARY SYMPTOMS
no urine discoloration/no flank pain L/no hematuria/no flank pain R/normal urinary frequency/no nocturia/no dysuria/no urinary hesitancy/no bladder infections

## 2019-09-21 NOTE — H&P PST ADULT - REASON FOR ADMISSION
"bilateral revision of my breast and reconstructive nipple surgery" "having reconstructive surgery to b/l upper arms & breast"

## 2019-09-21 NOTE — H&P PST ADULT - NEGATIVE OPHTHALMOLOGIC SYMPTOMS
no photophobia/no diplopia/no blurred vision R/no discharge R/no pain L/no irritation R/no discharge L/no pain R/no irritation L/no blurred vision L

## 2019-09-21 NOTE — H&P PST ADULT - NSICDXPROBLEM_GEN_ALL_CORE_FT
PROBLEM DIAGNOSES  Problem:   encounter cosmetic surgery  Assessment and Plan:   Pre-op education given to pt, including chlorhexidine soap  writt    Problem: Hypertension  Assessment and Plan: Instructed pt to continue BP meds     Problem: Need for prophylactic measure  Assessment and Plan: The Caprini score indicates that this patient is at high risk for a VTE event (score 6 or greater). Surgical patients in this group will benefit from both pharmacologic prophylaxis and intermittent compression devices.  The surgical team will determine the balance between VTE risk and bleeding risk, and other clinical considerations PROBLEM DIAGNOSES  Problem:   encounter cosmetic surgery  Assessment and Plan:   scheduled for suction assisted lipectomy upper arms revision of b/l breast reconstruction, b/l suction assisted lipectomy of the b/l breast reconstruction for symmetrizing on 10/3/2019  written and verbal preop instructions, gi prophylaxis, including chlorhexidine soap given  pt verbalized good understanding, with teach back on surgical soap instructions done    Problem: Hypertension  Assessment and Plan: Instructed pt to continue BP meds  medical eval requested by surgeon  pending copy of report  MACHO precautions recommended.  OR booking notified via fax.      Problem: Need for prophylactic measure  Assessment and Plan: The Caprini score indicates that this patient is at high risk for a VTE event (score 6 or greater). Surgical patients in this group will benefit from both pharmacologic prophylaxis and intermittent compression devices.  The surgical team will determine the balance between VTE risk and bleeding risk, and other clinical considerations

## 2019-09-21 NOTE — H&P PST ADULT - NEGATIVE MUSCULOSKELETAL SYMPTOMS
no joint swelling/no muscle weakness/no back pain/no leg pain R/no arthritis/no stiffness/no leg pain L/no neck pain/no arm pain L/no myalgia/no muscle cramps

## 2019-09-21 NOTE — H&P PST ADULT - LYMPHATIC
anterior cervical R/posterior cervical R/supraclavicular R/supraclavicular L/anterior cervical L/posterior cervical L

## 2019-09-21 NOTE — H&P PST ADULT - NSICDXPASTSURGICALHX_GEN_ALL_CORE_FT
PAST SURGICAL HISTORY:   delivery in  for one of the twin and one twin  vaginal delivery -      h/o   x 2 - 20 yrs ago     H/O breast biopsy x 2 (2018, 2018)    H/O tubal ligation     History of cholecystectomy     History of surgery Bilateral mastectomy and reconstruction surgery in 10/2018    S/P breast reconstruction, bilateral with implant exchange,, 2019    Status post bilateral breast reconstruction nipple reconstruction & abdominoplasty 2019    Status post partial hysterectomy 2012

## 2019-09-21 NOTE — H&P PST ADULT - NEGATIVE GASTROINTESTINAL SYMPTOMS
no nausea/no vomiting/no change in bowel habits/no constipation/no abdominal pain/no diarrhea/no melena

## 2019-10-03 ENCOUNTER — OUTPATIENT (OUTPATIENT)
Dept: OUTPATIENT SERVICES | Facility: HOSPITAL | Age: 49
LOS: 1 days | Discharge: HOME | End: 2019-10-03
Payer: COMMERCIAL

## 2019-10-03 VITALS
OXYGEN SATURATION: 100 % | DIASTOLIC BLOOD PRESSURE: 90 MMHG | SYSTOLIC BLOOD PRESSURE: 142 MMHG | RESPIRATION RATE: 16 BRPM | WEIGHT: 164.91 LBS | TEMPERATURE: 98 F | HEART RATE: 67 BPM | HEIGHT: 62.25 IN

## 2019-10-03 VITALS
DIASTOLIC BLOOD PRESSURE: 85 MMHG | OXYGEN SATURATION: 100 % | SYSTOLIC BLOOD PRESSURE: 143 MMHG | HEART RATE: 75 BPM | RESPIRATION RATE: 17 BRPM

## 2019-10-03 DIAGNOSIS — Z98.890 OTHER SPECIFIED POSTPROCEDURAL STATES: Chronic | ICD-10-CM

## 2019-10-03 DIAGNOSIS — Z90.711 ACQUIRED ABSENCE OF UTERUS WITH REMAINING CERVICAL STUMP: Chronic | ICD-10-CM

## 2019-10-03 PROCEDURE — 15877 SUCTION LIPECTOMY TRUNK: CPT

## 2019-10-03 RX ORDER — ONDANSETRON 8 MG/1
4 TABLET, FILM COATED ORAL ONCE
Refills: 0 | Status: COMPLETED | OUTPATIENT
Start: 2019-10-03 | End: 2019-10-03

## 2019-10-03 RX ORDER — LABETALOL HCL 100 MG
10 TABLET ORAL ONCE
Refills: 0 | Status: COMPLETED | OUTPATIENT
Start: 2019-10-03 | End: 2019-10-03

## 2019-10-03 RX ORDER — OXYCODONE HYDROCHLORIDE 5 MG/1
5 TABLET ORAL ONCE
Refills: 0 | Status: DISCONTINUED | OUTPATIENT
Start: 2019-10-03 | End: 2019-10-03

## 2019-10-03 RX ORDER — FENTANYL CITRATE 50 UG/ML
25 INJECTION INTRAVENOUS
Refills: 0 | Status: DISCONTINUED | OUTPATIENT
Start: 2019-10-03 | End: 2019-10-03

## 2019-10-03 RX ADMIN — Medication 10 MILLIGRAM(S): at 22:45

## 2019-10-03 RX ADMIN — ONDANSETRON 4 MILLIGRAM(S): 8 TABLET, FILM COATED ORAL at 22:45

## 2019-10-03 NOTE — ASU DISCHARGE PLAN (ADULT/PEDIATRIC) - CALL YOUR DOCTOR IF YOU HAVE ANY OF THE FOLLOWING:
Unable to urinate/Nausea and vomiting that does not stop/Wound/Surgical Site with redness, or foul smelling discharge or pus/Inability to tolerate liquids or foods/Bleeding that does not stop/Pain not relieved by Medications/Fever greater than (need to indicate Fahrenheit or Celsius)/Swelling that gets worse

## 2019-10-03 NOTE — BRIEF OPERATIVE NOTE - NSICDXBRIEFPROCEDURE_GEN_ALL_CORE_FT
PROCEDURES:  Liposuction, back 03-Oct-2019 21:44:00  Sánchez Zheng  Liposuction of both upper arms 03-Oct-2019 21:43:55  Sánchez Zheng  Revision, reconstruction, breast 03-Oct-2019 21:43:40  Sánchez Zheng

## 2019-10-03 NOTE — BRIEF OPERATIVE NOTE - OPERATION/FINDINGS
liposuction of bilateral upper back, liposuction of bilateral upper arms, revision of breast reconstruction with fat grafting

## 2019-10-03 NOTE — ASU PATIENT PROFILE, ADULT - PSH
delivery in  for one of the twin and one twin  vaginal delivery -     h/o   x 2 - 20 yrs ago    H/O breast biopsy  x 2 (2018, 2018)  H/O tubal ligation    History of cholecystectomy    History of surgery  Bilateral mastectomy and reconstruction surgery in 10/2018  S/P breast reconstruction, bilateral  with implant exchange,, 2019  Status post bilateral breast reconstruction  nipple reconstruction & abdominoplasty 2019  Status post partial hysterectomy  2012

## 2019-10-03 NOTE — ASU PATIENT PROFILE, ADULT - PMH
Anemia  on Iron Supplementation  Endometriosis    Gall stones    H/O uterine leiomyoma    Hypertension  Feb 2018  Intraductal carcinoma in situ of breast  left  Malignant neoplasm of unspecified site of unspecified female breast

## 2019-10-03 NOTE — ASU DISCHARGE PLAN (ADULT/PEDIATRIC) - DRIVING
He states he \"was under the weather\" as it was, but then going \"into the tube made it worse\".      I redirected the MRI order to Kirsten requesting the open bore machine.   
Venkat called, states he was not able to complete the MRI he had scheduled and now wonders what his next step is. Asked about having an open MRI.  Please call the patient to discuss.  Thank you      
Yes

## 2019-10-03 NOTE — ASU DISCHARGE PLAN (ADULT/PEDIATRIC) - CARE PROVIDER_API CALL
Jagdish Huggins)  Plastic Surgery  [address] [place] [address], [address]  [place] [address]  Phone: [phone]  Fax: [phone]  Follow Up Time: 1 week

## 2019-10-10 DIAGNOSIS — N64.9 DISORDER OF BREAST, UNSPECIFIED: ICD-10-CM

## 2020-05-27 ENCOUNTER — APPOINTMENT (OUTPATIENT)
Dept: SURGERY | Facility: CLINIC | Age: 50
End: 2020-05-27
Payer: COMMERCIAL

## 2020-05-27 PROCEDURE — 99213K: CUSTOM

## 2020-08-05 ENCOUNTER — OUTPATIENT (OUTPATIENT)
Dept: OUTPATIENT SERVICES | Facility: HOSPITAL | Age: 50
LOS: 1 days | End: 2020-08-05
Payer: SELF-PAY

## 2020-08-05 VITALS
SYSTOLIC BLOOD PRESSURE: 116 MMHG | DIASTOLIC BLOOD PRESSURE: 64 MMHG | TEMPERATURE: 98 F | HEIGHT: 61.5 IN | WEIGHT: 162.92 LBS | OXYGEN SATURATION: 98 % | HEART RATE: 87 BPM | RESPIRATION RATE: 16 BRPM

## 2020-08-05 DIAGNOSIS — C50.919 MALIGNANT NEOPLASM OF UNSPECIFIED SITE OF UNSPECIFIED FEMALE BREAST: ICD-10-CM

## 2020-08-05 DIAGNOSIS — Z98.890 OTHER SPECIFIED POSTPROCEDURAL STATES: Chronic | ICD-10-CM

## 2020-08-05 DIAGNOSIS — Z90.711 ACQUIRED ABSENCE OF UTERUS WITH REMAINING CERVICAL STUMP: Chronic | ICD-10-CM

## 2020-08-05 DIAGNOSIS — N65.0 DEFORMITY OF RECONSTRUCTED BREAST: ICD-10-CM

## 2020-08-05 LAB
ALBUMIN SERPL ELPH-MCNC: 5.2 G/DL — HIGH (ref 3.3–5)
ALP SERPL-CCNC: 70 U/L — SIGNIFICANT CHANGE UP (ref 40–120)
ALT FLD-CCNC: 49 U/L — HIGH (ref 4–33)
ANION GAP SERPL CALC-SCNC: 17 MMO/L — HIGH (ref 7–14)
AST SERPL-CCNC: 28 U/L — SIGNIFICANT CHANGE UP (ref 4–32)
BILIRUB SERPL-MCNC: 1.6 MG/DL — HIGH (ref 0.2–1.2)
BUN SERPL-MCNC: 14 MG/DL — SIGNIFICANT CHANGE UP (ref 7–23)
CALCIUM SERPL-MCNC: 9.8 MG/DL — SIGNIFICANT CHANGE UP (ref 8.4–10.5)
CHLORIDE SERPL-SCNC: 98 MMOL/L — SIGNIFICANT CHANGE UP (ref 98–107)
CO2 SERPL-SCNC: 25 MMOL/L — SIGNIFICANT CHANGE UP (ref 22–31)
CREAT SERPL-MCNC: 0.96 MG/DL — SIGNIFICANT CHANGE UP (ref 0.5–1.3)
GLUCOSE SERPL-MCNC: 88 MG/DL — SIGNIFICANT CHANGE UP (ref 70–99)
HCT VFR BLD CALC: 27.5 % — LOW (ref 34.5–45)
HGB BLD-MCNC: 9.3 G/DL — LOW (ref 11.5–15.5)
MCHC RBC-ENTMCNC: 31.2 PG — SIGNIFICANT CHANGE UP (ref 27–34)
MCHC RBC-ENTMCNC: 33.8 % — SIGNIFICANT CHANGE UP (ref 32–36)
MCV RBC AUTO: 92.3 FL — SIGNIFICANT CHANGE UP (ref 80–100)
NRBC # FLD: 0 K/UL — SIGNIFICANT CHANGE UP (ref 0–0)
PLATELET # BLD AUTO: 254 K/UL — SIGNIFICANT CHANGE UP (ref 150–400)
PMV BLD: 12.7 FL — SIGNIFICANT CHANGE UP (ref 7–13)
POTASSIUM SERPL-MCNC: 3.6 MMOL/L — SIGNIFICANT CHANGE UP (ref 3.5–5.3)
POTASSIUM SERPL-SCNC: 3.6 MMOL/L — SIGNIFICANT CHANGE UP (ref 3.5–5.3)
PROT SERPL-MCNC: 7.5 G/DL — SIGNIFICANT CHANGE UP (ref 6–8.3)
RBC # BLD: 2.98 M/UL — LOW (ref 3.8–5.2)
RBC # FLD: 17.3 % — HIGH (ref 10.3–14.5)
SODIUM SERPL-SCNC: 140 MMOL/L — SIGNIFICANT CHANGE UP (ref 135–145)
WBC # BLD: 9.19 K/UL — SIGNIFICANT CHANGE UP (ref 3.8–10.5)
WBC # FLD AUTO: 9.19 K/UL — SIGNIFICANT CHANGE UP (ref 3.8–10.5)

## 2020-08-05 PROCEDURE — 93010 ELECTROCARDIOGRAM REPORT: CPT

## 2020-08-05 RX ORDER — LOSARTAN POTASSIUM 100 MG/1
1 TABLET, FILM COATED ORAL
Qty: 0 | Refills: 0 | DISCHARGE

## 2020-08-05 RX ORDER — SODIUM CHLORIDE 9 MG/ML
1000 INJECTION, SOLUTION INTRAVENOUS
Refills: 0 | Status: DISCONTINUED | OUTPATIENT
Start: 2020-08-10 | End: 2020-08-25

## 2020-08-05 NOTE — H&P PST ADULT - NSANTHOSAYNRD_GEN_A_CORE
"I snore, but it's nothing crazy"/No. MACHO screening performed.  STOP BANG Legend: 0-2 = LOW Risk; 3-4 = INTERMEDIATE Risk; 5-8 = HIGH Risk

## 2020-08-05 NOTE — H&P PST ADULT - HISTORY OF PRESENT ILLNESS
Pt. is a 48 yo female with a H/O breast cancer.  Pt. had a FERNANDO mastectomy 10/9/18 and reconstruction 10/3/19.

## 2020-08-05 NOTE — H&P PST ADULT - NSICDXPROBLEM_GEN_ALL_CORE_FT
PROBLEM DIAGNOSES  Problem: Deformity of reconstructed breast  Assessment and Plan: Pt. is scheduled for revision FERNANDO breast reconstruction, FERNANDO fat grafting...8/10/20.  Pt. verbalized understanding of instructions and that Chlorhexidine is for external use.  Pt. is scheduled for COVID test 8/7/20.

## 2020-08-05 NOTE — H&P PST ADULT - NSICDXPASTSURGICALHX_GEN_ALL_CORE_FT
PAST SURGICAL HISTORY:   delivery in  for one of the twin and one twin  vaginal delivery -      H/O abdominoplasty 2019    h/o   x 2 - 20 yrs ago     H/O breast biopsy x 2 (2018, 2018)    H/O tubal ligation     History of cholecystectomy     History of surgery Bilateral mastectomy and reconstruction surgery in 10/2018    S/P breast reconstruction, bilateral with implant exchange,, 2019    Status post bilateral breast reconstruction nipple reconstruction & abdominoplasty 2019    Status post partial hysterectomy 2012

## 2020-08-06 DIAGNOSIS — Z01.818 ENCOUNTER FOR OTHER PREPROCEDURAL EXAMINATION: ICD-10-CM

## 2020-08-07 ENCOUNTER — APPOINTMENT (OUTPATIENT)
Dept: DISASTER EMERGENCY | Facility: CLINIC | Age: 50
End: 2020-08-07

## 2020-08-07 LAB — SARS-COV-2 N GENE NPH QL NAA+PROBE: NOT DETECTED

## 2020-08-07 NOTE — ASU PATIENT PROFILE, ADULT - PSH
delivery in  for one of the twin and one twin  vaginal delivery -     H/O abdominoplasty  2019  h/o   x 2 - 20 yrs ago    H/O breast biopsy  x 2 (2018, 2018)  H/O tubal ligation    History of cholecystectomy    History of surgery  Bilateral mastectomy and reconstruction surgery in 10/2018  S/P breast reconstruction, bilateral  with implant exchange,, 2019  Status post bilateral breast reconstruction  nipple reconstruction & abdominoplasty 2019  Status post partial hysterectomy  2012

## 2020-08-10 ENCOUNTER — RESULT REVIEW (OUTPATIENT)
Age: 50
End: 2020-08-10

## 2020-08-10 ENCOUNTER — OUTPATIENT (OUTPATIENT)
Dept: OUTPATIENT SERVICES | Facility: HOSPITAL | Age: 50
LOS: 1 days | Discharge: HOME | End: 2020-08-10
Payer: COMMERCIAL

## 2020-08-10 VITALS
OXYGEN SATURATION: 100 % | WEIGHT: 162.92 LBS | RESPIRATION RATE: 14 BRPM | DIASTOLIC BLOOD PRESSURE: 69 MMHG | HEART RATE: 70 BPM | HEIGHT: 61.5 IN | TEMPERATURE: 98 F | SYSTOLIC BLOOD PRESSURE: 106 MMHG

## 2020-08-10 VITALS
RESPIRATION RATE: 16 BRPM | DIASTOLIC BLOOD PRESSURE: 94 MMHG | OXYGEN SATURATION: 100 % | HEART RATE: 94 BPM | SYSTOLIC BLOOD PRESSURE: 119 MMHG

## 2020-08-10 DIAGNOSIS — Z98.890 OTHER SPECIFIED POSTPROCEDURAL STATES: Chronic | ICD-10-CM

## 2020-08-10 DIAGNOSIS — C50.919 MALIGNANT NEOPLASM OF UNSPECIFIED SITE OF UNSPECIFIED FEMALE BREAST: ICD-10-CM

## 2020-08-10 DIAGNOSIS — Z90.711 ACQUIRED ABSENCE OF UTERUS WITH REMAINING CERVICAL STUMP: Chronic | ICD-10-CM

## 2020-08-10 PROCEDURE — 88305 TISSUE EXAM BY PATHOLOGIST: CPT | Mod: 26

## 2020-08-10 PROCEDURE — 15771 GRFG AUTOL FAT LIPO 50 CC/<: CPT | Mod: 59

## 2020-08-10 PROCEDURE — 19380 REVJ RECONSTRUCTED BREAST: CPT | Mod: 50

## 2020-08-10 PROCEDURE — 15877 SUCTION LIPECTOMY TRUNK: CPT

## 2020-08-10 PROCEDURE — 15772 GRFG AUTOL FAT LIPO EA ADDL: CPT | Mod: 59

## 2020-08-10 RX ORDER — AZILSARTAN KAMEDOXOMIL AND CHLORTHALIDONE 40; 12.5 MG/1; MG/1
1 TABLET ORAL
Qty: 0 | Refills: 0 | DISCHARGE

## 2020-08-10 RX ORDER — ASCORBIC ACID 60 MG
1 TABLET,CHEWABLE ORAL
Qty: 0 | Refills: 0 | DISCHARGE

## 2020-08-10 RX ORDER — NEBIVOLOL HYDROCHLORIDE 5 MG/1
1 TABLET ORAL
Qty: 0 | Refills: 0 | DISCHARGE

## 2020-08-10 RX ORDER — FOLIC ACID 0.8 MG
1 TABLET ORAL
Qty: 0 | Refills: 0 | DISCHARGE

## 2020-08-10 RX ORDER — CHOLECALCIFEROL (VITAMIN D3) 125 MCG
1 CAPSULE ORAL
Qty: 0 | Refills: 0 | DISCHARGE

## 2020-08-10 RX ORDER — FERROUS SULFATE 325(65) MG
1 TABLET ORAL
Qty: 0 | Refills: 0 | DISCHARGE

## 2020-08-10 NOTE — BRIEF OPERATIVE NOTE - OPERATION/FINDINGS
Bilateral revision of reconstructed breasts with fat grafting from trunk to bilateral breasts. 120 mL to the left breast, 230 mL to the right breast.

## 2020-08-10 NOTE — ASU DISCHARGE PLAN (ADULT/PEDIATRIC) - CARE PROVIDER_API CALL
Jagdish Huggins  PLASTIC SURGERY  [address] [place] [id number]  Phone: [phone]  Fax: [phone]  Follow Up Time:

## 2020-08-10 NOTE — ASU DISCHARGE PLAN (ADULT/PEDIATRIC) - ASU DC SPECIAL INSTRUCTIONSFT
Keep surgical bra on until cleared. Please wear your compressive garment at all times until seen in the office.    Please keep your dressing clean, dry, and in place until follow up.    Please sponge bathe or wash with a wash cloth only until your first follow-up appointment.    No exercising, strenuous activity, or heavy lifting. Keep your arms below your head and your elbows below your shoulders.    Take medications as prescribed.    Please follow up with Dr. Huggins within x1 week after discharge from the hospital. You may call [phone] to schedule an appointment.

## 2020-08-10 NOTE — BRIEF OPERATIVE NOTE - NSICDXBRIEFPROCEDURE_GEN_ALL_CORE_FT
PROCEDURES:  Revision of reconstruction of both breasts using fat grafts 10-Aug-2020 12:05:20  Ga Owusu

## 2020-08-10 NOTE — ASU DISCHARGE PLAN (ADULT/PEDIATRIC) - CALL YOUR DOCTOR IF YOU HAVE ANY OF THE FOLLOWING:
Wound/Surgical Site with redness, or foul smelling discharge or pus/Pain not relieved by Medications/Bleeding that does not stop/Swelling that gets worse/Fever greater than (need to indicate Fahrenheit or Celsius) Bleeding that does not stop/Swelling that gets worse/Fever greater than (need to indicate Fahrenheit or Celsius)/Pain not relieved by Medications/Wound/Surgical Site with redness, or foul smelling discharge or pus/Nausea and vomiting that does not stop

## 2020-08-17 LAB — SURGICAL PATHOLOGY STUDY: SIGNIFICANT CHANGE UP

## 2020-09-03 NOTE — PATIENT PROFILE ADULT - NSPROEDAREADYLEARNOTH_GEN_A_NUR
Physician Discharge Summary     Patient ID:  Giancarlo Ji  [id number]  89 y.o.  1948    Admit date: 9/1/2020    Discharge date and time: 9/3/2020    Admitting Physician: Rajni Mcghee MD     Admission Diagnoses:   Patient Active Problem List   Diagnosis    Class 2 obesity in adult    Lower GI bleed    Diverticulitis of intestine with bleeding    Anemia due to blood loss, acute    Essential hypertension    Acquired hypothyroidism    Osteoarthritis of multiple joints    Diverticular disease of intestine with perforation and abscess    Paroxysmal atrial fibrillation (HCC)    Arthritis of knee, right    Arthritis of knee, left    Parastomal hernia with obstruction and without gangrene    Malfunction of colostomy stoma ([address]    Small bowel obstruction ([address]    SBO (small bowel obstruction) ([address]    Ileostomy dysfunction ([address]       Discharge Diagnoses:   Patient Active Problem List   Diagnosis    Class 2 obesity in adult    Lower GI bleed    Diverticulitis of intestine with bleeding    Anemia due to blood loss, acute    Essential hypertension    Acquired hypothyroidism    Osteoarthritis of multiple joints    Diverticular disease of intestine with perforation and abscess    Paroxysmal atrial fibrillation (HCC)    Arthritis of knee, right    Arthritis of knee, left    Parastomal hernia with obstruction and without gangrene    Malfunction of colostomy stoma ([address]    Small bowel obstruction ([address]    SBO (small bowel obstruction) ([address]    Ileostomy dysfunction ([address]       Admission Condition: good    Discharged Condition: good    Indication for Admission: s/p revision ileostomy, malfunction ileostomy    Hospital Course: Giancarlo Ji is a 74 y.o. female admitted after lap ileostomy revision. She did well postoperatively, diet was advanced, they were ambulating independently and pain was controlled. They were discharged with appropriate medication, instructions and follow up. clean and dry    Follow-up with Dr Jalaine Heimlich in 2 weeks.     SignedVipul Dickey  9/3/2020  9:27 AM none

## 2021-01-20 NOTE — ASU DISCHARGE PLAN (ADULT/PEDIATRIC) - ASU DC SPECIAL INSTRUCTIONSFT
Diabetes mellitus type 2 in obese Diabetes Diabetes mellitus type 2 in obese Cellulitis of right lower extremity Diabetes mellitus type 2 in obese Cellulitis of right lower extremity Diabetes mellitus type 2 in obese Cellulitis of right lower extremity Cellulitis of right lower extremity Cellulitis of right lower extremity Cellulitis of right lower extremity Cellulitis of right lower extremity Cellulitis of right lower extremity Cellulitis of right lower extremity Cellulitis of right lower extremity Cellulitis of right lower extremity Cellulitis of right lower extremity Resume normal diet. Avoid straining, exercise, or heavy lifting.    Leave compression garment in place.    Take medications as instructed by prescriptions.    Sponge bathe only. Cellulitis of right lower extremity Cellulitis of right lower extremity

## 2021-05-24 ENCOUNTER — APPOINTMENT (OUTPATIENT)
Dept: SURGERY | Facility: CLINIC | Age: 51
End: 2021-05-24
Payer: COMMERCIAL

## 2021-05-24 PROCEDURE — 99213K: CUSTOM

## 2021-08-04 ENCOUNTER — APPOINTMENT (OUTPATIENT)
Dept: SURGERY | Facility: CLINIC | Age: 51
End: 2021-08-04
Payer: COMMERCIAL

## 2021-08-04 PROCEDURE — 19000K: CUSTOM | Mod: LT

## 2021-08-04 PROCEDURE — 99213K: CUSTOM | Mod: 25

## 2021-09-27 ENCOUNTER — TRANSCRIPTION ENCOUNTER (OUTPATIENT)
Age: 51
End: 2021-09-27

## 2021-09-27 ENCOUNTER — APPOINTMENT (OUTPATIENT)
Dept: DISASTER EMERGENCY | Facility: CLINIC | Age: 51
End: 2021-09-27

## 2021-09-28 LAB — SARS-COV-2 N GENE NPH QL NAA+PROBE: NOT DETECTED

## 2022-03-07 NOTE — BRIEF OPERATIVE NOTE - NSICDXBRIEFPROCEDURE_GEN_ALL_CORE_FT
Spoke with the patient, made her aware that we will provide her with an update as soon as Dr. Morrison reviews. Patient states she cannot find the referral that her provider gave her for Dr. Morrison. Told patient I'd follow up with the provider for her and obtain the referral. Patient agreed and she verbalized her understanding.   PROCEDURES:  Reconstruction, nipple and areola 13-May-2019 13:39:40  Antione Owusu  Revision of reconstruction of breast using fat graft 13-May-2019 13:39:28  Antione Owusu  Combined abdominoplasty with liposuction of flank 13-May-2019 13:39:13  Antione Owusu

## 2022-05-02 ENCOUNTER — APPOINTMENT (OUTPATIENT)
Dept: SURGERY | Facility: CLINIC | Age: 52
End: 2022-05-02
Payer: COMMERCIAL

## 2022-05-02 PROCEDURE — 99213K: CUSTOM | Mod: 25

## 2022-05-02 PROCEDURE — 19000K: CUSTOM | Mod: LT

## 2023-01-31 NOTE — PATIENT PROFILE ADULT - NSPROEDAABILITYLEARN_GEN_A_NUR
none Hydroxychloroquine Pregnancy And Lactation Text: This medication has been shown to cause fetal harm but it isn't assigned a Pregnancy Risk Category. There are small amounts excreted in breast milk.

## 2023-03-29 NOTE — H&P PST ADULT - TEMPERATURE IN CELSIUS (DEGREES C)
Lucia Matias has met all discharge criteria from Phase II. Vital Signs are stable, ambulating  without difficulty. Discharge instructions given, patient verbalized understanding. Discharged from facility via wheelchair in stable condition.       36.7

## 2023-05-31 ENCOUNTER — APPOINTMENT (OUTPATIENT)
Dept: SURGERY | Facility: CLINIC | Age: 53
End: 2023-05-31
Payer: COMMERCIAL

## 2023-05-31 PROCEDURE — 99213K: CUSTOM

## 2023-07-18 NOTE — PRE-OP CHECKLIST - DNR CLARIFICATION FORM COMPLETED
Price (Do Not Change): 0.00 Instructions: This plan will send the code FBSE to the PM system.  DO NOT or CHANGE the price. Detail Level: Simple n/a

## 2024-05-13 ENCOUNTER — APPOINTMENT (OUTPATIENT)
Dept: SURGERY | Facility: CLINIC | Age: 54
End: 2024-05-13
Payer: COMMERCIAL

## 2024-05-13 PROCEDURE — 99213K: CUSTOM

## 2025-05-05 ENCOUNTER — APPOINTMENT (OUTPATIENT)
Dept: SURGERY | Facility: CLINIC | Age: 55
End: 2025-05-05
Payer: COMMERCIAL

## 2025-05-05 PROCEDURE — 19000K: CUSTOM | Mod: LT

## 2025-05-05 PROCEDURE — 99213K: CUSTOM | Mod: 25

## 2025-07-31 NOTE — ASU DISCHARGE PLAN (ADULT/PEDIATRIC). - SPECIAL INSTRUCTIONS
Constipation Tips  - Eat more high fiber foods   - Take a Fiber supplement (Metamucil, Benefiber, Citrucell, etc)  - Take Miralax (as needed up to 4x/day)  - Drink at least 8 cups of water a day  - Get regular physical activity  - Use a stool or Squatty Potty  - Avoid sitting on the toilet >5 minutes to prevent hemorrhoids    WOUND CARE:  Please keep incisions clean and dry. Please do not Scrub or rub incisions. Do not use lotion or powder on incisions.   BATHING: You may shower and/or sponge bathe starting tomorrow. You may use warm soapy water in the shower and rinse, pat dry. Keep bra on and dry  ACTIVITY: No heavy lifting or straining. Otherwise, you may return to your usual level of physical activity. If you are taking narcotic pain medication DO NOT drive a car, operate machinery or make important decisions.  DIET: Return to your usual diet.  NOTIFY YOUR SURGEON IF: You have any bleeding that does not stop, any pus draining from your wound(s), any fever (over 100.4 F) persistent nausea/vomiting, or if your pain is not controlled on your discharge pain medications, unable to urinate.  Please follow up with your primary care physician in one week regarding your hospitalization, bring copies of your discharge paperwork.  follow up with Dr. Huggins in 1 week. Call office to make appointment.